# Patient Record
Sex: FEMALE | Race: WHITE | NOT HISPANIC OR LATINO | Employment: OTHER | URBAN - METROPOLITAN AREA
[De-identification: names, ages, dates, MRNs, and addresses within clinical notes are randomized per-mention and may not be internally consistent; named-entity substitution may affect disease eponyms.]

---

## 2020-07-30 LAB
ABS NEUT (OLG): 3.97 X10(3)/MCL (ref 2.1–9.2)
APPEARANCE, UA: CLEAR
BACTERIA SPEC CULT: ABNORMAL /HPF
BASOPHILS # BLD AUTO: 0.1 X10(3)/MCL (ref 0–0.2)
BASOPHILS NFR BLD AUTO: 1 %
BILIRUB UR QL STRIP: NEGATIVE
BUN SERPL-MCNC: 19.3 MG/DL (ref 9.8–20.1)
CALCIUM SERPL-MCNC: 9.2 MG/DL (ref 8.4–10.2)
CHLORIDE SERPL-SCNC: 105 MMOL/L (ref 98–107)
CO2 SERPL-SCNC: 29 MMOL/L (ref 23–31)
COLOR UR: YELLOW
CREAT SERPL-MCNC: 0.78 MG/DL (ref 0.55–1.02)
CREAT/UREA NIT SERPL: 25
EOSINOPHIL # BLD AUTO: 0.3 X10(3)/MCL (ref 0–0.9)
EOSINOPHIL NFR BLD AUTO: 4 %
ERYTHROCYTE [DISTWIDTH] IN BLOOD BY AUTOMATED COUNT: 13.7 % (ref 11.5–17)
GLUCOSE (UA): NEGATIVE
GLUCOSE SERPL-MCNC: 102 MG/DL (ref 82–115)
HCT VFR BLD AUTO: 40.8 % (ref 37–47)
HGB BLD-MCNC: 12.7 GM/DL (ref 12–16)
HGB UR QL STRIP: ABNORMAL
KETONES UR QL STRIP: NEGATIVE
LEUKOCYTE ESTERASE UR QL STRIP: ABNORMAL
LYMPHOCYTES # BLD AUTO: 2.1 X10(3)/MCL (ref 0.6–4.6)
LYMPHOCYTES NFR BLD AUTO: 28 %
MCH RBC QN AUTO: 27.5 PG (ref 27–31)
MCHC RBC AUTO-ENTMCNC: 31.1 GM/DL (ref 33–36)
MCV RBC AUTO: 88.3 FL (ref 80–94)
MONOCYTES # BLD AUTO: 0.8 X10(3)/MCL (ref 0.1–1.3)
MONOCYTES NFR BLD AUTO: 11 %
NEUTROPHILS # BLD AUTO: 3.97 X10(3)/MCL (ref 2.1–9.2)
NEUTROPHILS NFR BLD AUTO: 55 %
NITRITE UR QL STRIP: NEGATIVE
PH UR STRIP: 6.5 [PH] (ref 5–9)
PLATELET # BLD AUTO: 290 X10(3)/MCL (ref 130–400)
PMV BLD AUTO: 11.1 FL (ref 9.4–12.4)
POTASSIUM SERPL-SCNC: 3.8 MMOL/L (ref 3.5–5.1)
PROT UR QL STRIP: NEGATIVE
RBC # BLD AUTO: 4.62 X10(6)/MCL (ref 4.2–5.4)
RBC #/AREA URNS HPF: 9 /HPF (ref 0–2)
SODIUM SERPL-SCNC: 143 MMOL/L (ref 136–145)
SP GR UR STRIP: 1.02 (ref 1–1.03)
SQUAMOUS EPITHELIAL, UA: ABNORMAL
UROBILINOGEN UR STRIP-ACNC: 0.2
WBC # SPEC AUTO: 7.2 X10(3)/MCL (ref 4.5–11.5)
WBC #/AREA URNS HPF: 6 /HPF (ref 0–3)

## 2020-08-04 ENCOUNTER — HISTORICAL (OUTPATIENT)
Dept: SURGERY | Facility: HOSPITAL | Age: 70
End: 2020-08-04

## 2022-04-18 ENCOUNTER — HISTORICAL (OUTPATIENT)
Dept: SURGERY | Facility: HOSPITAL | Age: 72
End: 2022-04-18
Payer: MEDICARE

## 2022-04-30 NOTE — OP NOTE
DATE OF SURGERY:        SURGEON:  Troy Hunter MD    PREOPERATIVE DIAGNOSIS:  Carpal tunnel, right wrist.    POSTOPERATIVE DIAGNOSIS:  Carpal tunnel, right wrist.    PROCEDURE:  Carpal tunnel release.    ANESTHESIA:  Axillary block.    ESTIMATED BLOOD LOSS:  Minimal    PROCEDURE IN DETAIL:  In the operating suite, under axillary block anesthetic, the right arm was prepped and draped in a sterile fashion.  The arm was exsanguinated and tourniquet inflated to 250 mmHg.  The right palm was explored through a small proximal curved incision.  Skin and subcutaneous tissue incised.  Bleeding was controlled with Bovie cautery.  The median nerve was identified at the level of the distal wrist crease and the carpal ligament transected on the ulnar border of the canal throughout the entire length of the canal using a 69 [place] blade.  During dissection, care was taken to identify and preserve the recurrent motor branch.  I tunneled the proximal and distal forearm and released the subcutaneous fascia.  The epineurium surrounding the nerve was gently teased with fine pickups and forceps.  Once the nerve was completely freed, tourniquet was released.  Final hemostasis obtained using Bovie cautery.  The skin incision was closed with vertical mattress sutures of 5-0 and 6-0 Prolene.  A sterile splint dressing was then applied and the procedure terminated.        ______________________________  MD TODD TolentinoL/UED  DD:  08/04/2020  Time:  08:17AM  DT:  08/04/2020  Time:  08:36AM  Job #:  [id number]

## 2022-05-06 ENCOUNTER — OFFICE VISIT (OUTPATIENT)
Dept: ORTHOPEDICS | Facility: CLINIC | Age: 72
End: 2022-05-06
Payer: MEDICARE

## 2022-05-06 VITALS
SYSTOLIC BLOOD PRESSURE: 133 MMHG | HEIGHT: 66 IN | WEIGHT: 140 LBS | BODY MASS INDEX: 22.5 KG/M2 | HEART RATE: 56 BPM | DIASTOLIC BLOOD PRESSURE: 87 MMHG

## 2022-05-06 DIAGNOSIS — M54.9 CHRONIC BACK PAIN GREATER THAN 3 MONTHS DURATION: Primary | Chronic | ICD-10-CM

## 2022-05-06 DIAGNOSIS — M51.26 DISC DISPLACEMENT, LUMBAR: Chronic | ICD-10-CM

## 2022-05-06 DIAGNOSIS — M54.16 LUMBAR RADICULITIS: Chronic | ICD-10-CM

## 2022-05-06 DIAGNOSIS — M51.36 LUMBAR DEGENERATIVE DISC DISEASE: Chronic | ICD-10-CM

## 2022-05-06 DIAGNOSIS — G89.29 CHRONIC BACK PAIN GREATER THAN 3 MONTHS DURATION: Primary | Chronic | ICD-10-CM

## 2022-05-06 PROCEDURE — 99213 OFFICE O/P EST LOW 20 MIN: CPT | Mod: ,,, | Performed by: ANESTHESIOLOGY

## 2022-05-06 PROCEDURE — 99213 PR OFFICE/OUTPT VISIT, EST, LEVL III, 20-29 MIN: ICD-10-PCS | Mod: ,,, | Performed by: ANESTHESIOLOGY

## 2022-05-06 RX ORDER — SODIUM PICOSULFATE, MAGNESIUM OXIDE, AND ANHYDROUS CITRIC ACID 10; 3.5; 12 MG/160ML; G/160ML; G/160ML
LIQUID ORAL
COMMUNITY
Start: 2022-03-23 | End: 2022-07-28 | Stop reason: ALTCHOICE

## 2022-05-06 RX ORDER — CLOTRIMAZOLE AND BETAMETHASONE DIPROPIONATE 10; .64 MG/G; MG/G
CREAM TOPICAL
Status: ON HOLD | COMMUNITY
End: 2022-10-17

## 2022-05-06 RX ORDER — OXYBUTYNIN CHLORIDE 10 MG/1
TABLET, EXTENDED RELEASE ORAL
COMMUNITY
End: 2022-07-28 | Stop reason: ALTCHOICE

## 2022-05-06 RX ORDER — LOSARTAN POTASSIUM 100 MG/1
TABLET ORAL
COMMUNITY

## 2022-05-06 RX ORDER — MULTIVITAMIN
1 TABLET ORAL DAILY
COMMUNITY

## 2022-05-06 RX ORDER — AZELASTINE HYDROCHLORIDE 0.5 MG/ML
SOLUTION/ DROPS OPHTHALMIC
COMMUNITY

## 2022-05-06 RX ORDER — RISEDRONATE SODIUM 35 MG/1
35 TABLET, FILM COATED ORAL WEEKLY
COMMUNITY
Start: 2022-01-24 | End: 2022-07-28 | Stop reason: ALTCHOICE

## 2022-05-06 RX ORDER — AMLODIPINE BESYLATE 5 MG/1
TABLET ORAL
COMMUNITY

## 2022-05-06 RX ORDER — SOLIFENACIN SUCCINATE 10 MG/1
1 TABLET, FILM COATED ORAL EVERY MORNING
COMMUNITY
End: 2022-07-28 | Stop reason: ALTCHOICE

## 2022-05-06 NOTE — PROGRESS NOTES
Lisa Concepcion MD        PATIENT NAME: Greta Chun  : 1950  DATE: 22  MRN: [id number]      Billing Provider: Lisa Concepcion MD  Level of Service:   Patient PCP Information     Provider PCP Type    Primary Doctor No General          Reason for Visit / Chief Complaint: Post-op Evaluation (Post-op Right L4 & L5 TFESI, pt states she still has pain, injection gave her relief for about 2 weeks, pain comes and goes)       Update PCP  Update Chief Complaint         History of Present Illness / Problem Focused Workflow     Greta Chun presents to the clinic with Post-op Evaluation (Post-op Right L4 & L5 TFESI, pt states she still has pain, injection gave her relief for about 2 weeks, pain comes and goes)     This is a 72-year-old female who returns to clinic today for follow-up of her chronic low back pain radiating down the right lower extremity.  She underwent right L4-5 transforaminal epidural steroid injection about 3 weeks ago.  She states that she did very well for 2 weeks, but some of the pain has come back.  She states that the pain is still not as often or as severe as it was before the injections.  She currently does not have any pain at all, but has gotten up to 8/10 at worst a couple days ago when she was standing in the kitchen to cook dinner.      Review of Systems     Review of Systems   Constitutional: Negative.    HENT: Negative.    Respiratory: Negative.    Cardiovascular: Negative.    Gastrointestinal: Negative.    Musculoskeletal: Positive for back pain and leg pain.   Integumentary:  Negative.   Neurological: Negative.    Hematological: Negative.    Psychiatric/Behavioral: Negative.         Medical / Social / Family History     Past Medical History:   Diagnosis Date    Hypertension     Tumors        Past Surgical History:   Procedure Laterality Date    BACK SURGERY      CARPAL TUNNEL RELEASE      SURGICAL REMOVAL OF BONE SPUR      TUBAL LIGATION         Social History  Ms. Chun   reports that she has never smoked. She has never used smokeless tobacco. She reports previous alcohol use. She reports that she does not use drugs.    Family History  Ms.'s Luent family history is not on file.    Medications and Allergies     Medications  Outpatient Medications Marked as Taking for the 5/6/22 encounter (Office Visit) with Lisa Concepcion MD   Medication Sig Dispense Refill    amLODIPine (NORVASC) 5 MG tablet amlodipine 5 mg tablet   TAKE 1 TABLET BY MOUTH EVERY DAY      azelastine (OPTIVAR) 0.05 % ophthalmic solution azelastine 0.05 % eye drops   INSTILL 1 DROP INTO BOTH EYES TWICE DAILY      CLENPIQ 10 mg-3.5 gram -12 gram/160 mL Soln TAKE AS DIRECTED      clotrimazole-betamethasone 1-0.05% (LOTRISONE) cream clotrimazole-betamethasone 1 %-0.05 % topical cream   APPLY TWICE DAILY      losartan (COZAAR) 100 MG tablet losartan 100 mg tablet   TAKE 1 TABLET BY MOUTH EVERY DAY      multivit with min-folic acid 0.4 mg Tab 1 tablet      oxybutynin (DITROPAN-XL) 10 MG 24 hr tablet oxybutynin chloride ER 10 mg tablet,extended release 24 hr   TAKE 1 TABLET BY MOUTH EVERY DAY      propranoloL 120 mg Cp24 propranolol  mg capsule,24 hr,extended release   TAKE 1 CAPSULE BY MOUTH EVERY DAY      risedronate (ACTONEL) 35 MG tablet Take 35 mg by mouth once a week.      solifenacin (VESICARE) 10 MG tablet Take 1 tablet by mouth every morning.         Allergies  Review of patient's allergies indicates:   Allergen Reactions    Celebrex [celecoxib]        Physical Examination     Vitals:    05/06/22 0955   BP: 133/87   Pulse: (!) 56     Spine Musculoskeletal Exam    General        Constitutional: appears stated age, well-developed and well-nourished    Scleral icterus: no    Labored breathing: no    Psychiatric: normal mood and affect and no acute distress    Neurological: alert and oriented x3    Skin: intact    Gait      Gait is normal.    Inspection        Thoracolumbar      Thoracolumbar inspection  is normal.    Strength      Thoracolumbar      Thoracolumbar motor exam is normal.    Sensory       Thoracolumbar      Thoracolumbar sensation is normal.       Assessment and Plan (including Health Maintenance)      Problem List  Smart Sets  Document Outside HM   :    Plan:   Chronic back pain greater than 3 months duration    Lumbar degenerative disc disease    Disc displacement, lumbar    Lumbar radiculitis    She is better overall since her injections last month. Discussed repeating but she'd like to hold off for now. Will call when she is ready.       Problem List Items Addressed This Visit        Orthopedic Problems    Lumbar degenerative disc disease    Disc displacement, lumbar       Other    Chronic back pain greater than 3 months duration - Primary    Lumbar radiculitis            No future appointments.     There are no Patient Instructions on file for this visit.  No follow-ups on file.     Signature:  Lisa Concepcion MD      Date of encounter: 5/6/22

## 2022-05-21 NOTE — HISTORICAL [facility] CERNER
This is a historical note converted from Abhinav. Formatting and pictures may have been removed.  Please reference Abhinav for original formatting and attached multimedia. Procedure Name  1. Right L4?Transforaminal Epidural Steroid Injection  2. Right L5 Transforaminal Epidural Steroid Injection  ?  Pre-Procedure Diagnoses:  1. Chronic pain syndrome  2. Low back pain  3. Lumbar radiculopathy  4. Lumbar disc displacement  5. Lumbar degenerative disc disease  ?   Post-Procedure Diagnoses:  1. Chronic pain syndrome  2. Low back pain  3. Lumbar radiculopathy  4. Lumbar disc displacement  5. Lumbar degenerative disc disease  ?   Anesthesia:  Local and MAC  ?   Estimated Blood Loss:  None  ?  Complications:  None  ?  Informed Consent:  The procedure, risks, benefits, and alternatives were discussed with the patient.? There were no contraindications to the procedure.? The patient expressed understanding and agreed to proceed.? Fully informed written consent was obtained.?  ?  Description of the Procedure:  The patient was taken to the operating room.? IV access was obtained prior to the start of the procedure.? The patient was positioned prone on the fluoroscopy table.? Continuous hemodynamic monitoring was initiated and continued throughout the duration of the procedure.? The skin overlying the lumbosacral spine was prepped with Chloraprep and draped into a sterile field.? An oblique fluoroscopic view was obtained on the right side at L4, with the superior articular process of the inferior vertebral body aligned with the pedicle.? Skin anesthesia was achieved using 1 mL of 1% lidocaine.? A 22-gauge 3.5-inch Quinke spinal needle was slowly inserted and advanced towards the 6 oclock position of the pedicle and into the epidural space.? Proper needle position was confirmed under AP, oblique, and lateral fluoroscopic views.? Negative aspiration for blood or CSF was confirmed.? 0.5 mL of Isovue contrast was injected.?  Fluoroscopic imaging revealed a clear outline of the spinal nerve with proximal spread of agent through the neural foramen and into the epidural space.? Then a combination of 5 mg of dexamethasone and 1 mL of 0.25% bupivacaine was easily injected.? There was no pain on injection.? The needle was removed intact and bleeding was nil.? The same procedure was repeated in identical fashion on the right side at L5. Sterile bandages were applied.? The patient was taken to the recovery room for further observation in stable condition.? The patient was then discharged home without any complications.

## 2022-07-12 ENCOUNTER — OFFICE VISIT (OUTPATIENT)
Dept: ORTHOPEDICS | Facility: CLINIC | Age: 72
End: 2022-07-12
Payer: MEDICARE

## 2022-07-12 DIAGNOSIS — M51.26 DISC DISPLACEMENT, LUMBAR: ICD-10-CM

## 2022-07-12 DIAGNOSIS — M51.36 LUMBAR DEGENERATIVE DISC DISEASE: Primary | ICD-10-CM

## 2022-07-12 DIAGNOSIS — M54.9 CHRONIC BACK PAIN GREATER THAN 3 MONTHS DURATION: ICD-10-CM

## 2022-07-12 DIAGNOSIS — M54.16 LUMBAR RADICULITIS: ICD-10-CM

## 2022-07-12 DIAGNOSIS — G89.29 CHRONIC BACK PAIN GREATER THAN 3 MONTHS DURATION: ICD-10-CM

## 2022-07-12 PROCEDURE — 99214 OFFICE O/P EST MOD 30 MIN: CPT | Mod: ,,, | Performed by: ANESTHESIOLOGY

## 2022-07-12 PROCEDURE — 99214 PR OFFICE/OUTPT VISIT, EST, LEVL IV, 30-39 MIN: ICD-10-PCS | Mod: ,,, | Performed by: ANESTHESIOLOGY

## 2022-07-12 NOTE — PROGRESS NOTES
Lisa Concepcion MD        PATIENT NAME: Greta Chun  : 1950  DATE: 22  MRN: [id number]      Billing Provider: Lisa Concepcion MD  Level of Service:   Patient PCP Information     Provider PCP Type    Primary Doctor No General          Reason for Visit / Chief Complaint: Follow-up (C/o Right sciatic pain. Pt states she would like to schedule procedure. She reports inj did help for few weeks; increase pain when standing in one place for too long. Pain level 7-8 out of 10. )       Update PCP  Update Chief Complaint         History of Present Illness / Problem Focused Workflow     Greta Chun presents to the clinic with Follow-up (C/o Right sciatic pain. Pt states she would like to schedule procedure. She reports inj did help for few weeks; increase pain when standing in one place for too long. Pain level 7-8 out of 10. )     Pain starting to become more frequent and worse in severity since last visit  Pain present primarily when standing in one place for too long      Review of Systems     Review of Systems   Musculoskeletal: Positive for back pain and leg pain.   All other systems reviewed and are negative.       Medical / Social / Family History     Past Medical History:   Diagnosis Date    Hypertension     Tumors        Past Surgical History:   Procedure Laterality Date    BACK SURGERY      CARPAL TUNNEL RELEASE      SURGICAL REMOVAL OF BONE SPUR      TUBAL LIGATION         Social History  Ms. Chun  reports that she has never smoked. She has never used smokeless tobacco. She reports previous alcohol use. She reports that she does not use drugs.    Family History  Ms.'s Chun family history is not on file.    Medications and Allergies     Medications  Outpatient Medications Marked as Taking for the 22 encounter (Office Visit) with Lisa Concepcion MD   Medication Sig Dispense Refill    amLODIPine (NORVASC) 5 MG tablet amlodipine 5 mg tablet   TAKE 1 TABLET BY MOUTH EVERY DAY       azelastine (OPTIVAR) 0.05 % ophthalmic solution azelastine 0.05 % eye drops   INSTILL 1 DROP INTO BOTH EYES TWICE DAILY      CLENPIQ 10 mg-3.5 gram -12 gram/160 mL Soln TAKE AS DIRECTED      clotrimazole-betamethasone 1-0.05% (LOTRISONE) cream clotrimazole-betamethasone 1 %-0.05 % topical cream   APPLY TWICE DAILY      losartan (COZAAR) 100 MG tablet losartan 100 mg tablet   TAKE 1 TABLET BY MOUTH EVERY DAY      multivit with min-folic acid 0.4 mg Tab 1 tablet      oxybutynin (DITROPAN-XL) 10 MG 24 hr tablet oxybutynin chloride ER 10 mg tablet,extended release 24 hr   TAKE 1 TABLET BY MOUTH EVERY DAY      propranoloL 120 mg Cp24 propranolol  mg capsule,24 hr,extended release   TAKE 1 CAPSULE BY MOUTH EVERY DAY      risedronate (ACTONEL) 35 MG tablet Take 35 mg by mouth once a week.         Allergies  Review of patient's allergies indicates:   Allergen Reactions    Celebrex [celecoxib]        Physical Examination   There were no vitals filed for this visit.  Spine Musculoskeletal Exam    General        Constitutional: appears stated age, well-developed and well-nourished    Scleral icterus: no    Labored breathing: no    Psychiatric: normal mood and affect and no acute distress    Neurological: alert and oriented x3    Skin: intact    Gait      Gait is normal.    Inspection      Leg length disparity: no discrepancy      Thoracolumbar      Erythema: none    Swelling: none    Edema (right lower extremity): none    Edema (left lower extremity): none    Ecchymosis: none    Deformity: none       Assessment and Plan (including Health Maintenance)      Problem List  Smart Sets  Document Outside HM   :    Plan:   Lumbar degenerative disc disease    Disc displacement, lumbar    Lumbar radiculitis    Chronic back pain greater than 3 months duration       Schedule right L4 and L5 TFESI.    Problem List Items Addressed This Visit        Orthopedic Problems    Lumbar degenerative disc disease - Primary    Disc  displacement, lumbar       Other    Chronic back pain greater than 3 months duration    Lumbar radiculitis            No future appointments.     There are no Patient Instructions on file for this visit.  No follow-ups on file.     Signature:  Lisa Concepcion MD      Date of encounter: 7/12/22

## 2022-07-12 NOTE — H&P (VIEW-ONLY)
Lisa Concepcion MD        PATIENT NAME: Greta Chun  : 1950  DATE: 22  MRN: [id number]      Billing Provider: Lisa Concepcion MD  Level of Service:   Patient PCP Information     Provider PCP Type    Primary Doctor No General          Reason for Visit / Chief Complaint: Follow-up (C/o Right sciatic pain. Pt states she would like to schedule procedure. She reports inj did help for few weeks; increase pain when standing in one place for too long. Pain level 7-8 out of 10. )       Update PCP  Update Chief Complaint         History of Present Illness / Problem Focused Workflow     Greta Chun presents to the clinic with Follow-up (C/o Right sciatic pain. Pt states she would like to schedule procedure. She reports inj did help for few weeks; increase pain when standing in one place for too long. Pain level 7-8 out of 10. )     Pain starting to become more frequent and worse in severity since last visit  Pain present primarily when standing in one place for too long      Review of Systems     Review of Systems   Musculoskeletal: Positive for back pain and leg pain.   All other systems reviewed and are negative.       Medical / Social / Family History     Past Medical History:   Diagnosis Date    Hypertension     Tumors        Past Surgical History:   Procedure Laterality Date    BACK SURGERY      CARPAL TUNNEL RELEASE      SURGICAL REMOVAL OF BONE SPUR      TUBAL LIGATION         Social History  Ms. Chun  reports that she has never smoked. She has never used smokeless tobacco. She reports previous alcohol use. She reports that she does not use drugs.    Family History  Ms.'s Chun family history is not on file.    Medications and Allergies     Medications  Outpatient Medications Marked as Taking for the 22 encounter (Office Visit) with Lisa Concepcion MD   Medication Sig Dispense Refill    amLODIPine (NORVASC) 5 MG tablet amlodipine 5 mg tablet   TAKE 1 TABLET BY MOUTH EVERY DAY       azelastine (OPTIVAR) 0.05 % ophthalmic solution azelastine 0.05 % eye drops   INSTILL 1 DROP INTO BOTH EYES TWICE DAILY      CLENPIQ 10 mg-3.5 gram -12 gram/160 mL Soln TAKE AS DIRECTED      clotrimazole-betamethasone 1-0.05% (LOTRISONE) cream clotrimazole-betamethasone 1 %-0.05 % topical cream   APPLY TWICE DAILY      losartan (COZAAR) 100 MG tablet losartan 100 mg tablet   TAKE 1 TABLET BY MOUTH EVERY DAY      multivit with min-folic acid 0.4 mg Tab 1 tablet      oxybutynin (DITROPAN-XL) 10 MG 24 hr tablet oxybutynin chloride ER 10 mg tablet,extended release 24 hr   TAKE 1 TABLET BY MOUTH EVERY DAY      propranoloL 120 mg Cp24 propranolol  mg capsule,24 hr,extended release   TAKE 1 CAPSULE BY MOUTH EVERY DAY      risedronate (ACTONEL) 35 MG tablet Take 35 mg by mouth once a week.         Allergies  Review of patient's allergies indicates:   Allergen Reactions    Celebrex [celecoxib]        Physical Examination   There were no vitals filed for this visit.  Spine Musculoskeletal Exam    General        Constitutional: appears stated age, well-developed and well-nourished    Scleral icterus: no    Labored breathing: no    Psychiatric: normal mood and affect and no acute distress    Neurological: alert and oriented x3    Skin: intact    Gait      Gait is normal.    Inspection      Leg length disparity: no discrepancy      Thoracolumbar      Erythema: none    Swelling: none    Edema (right lower extremity): none    Edema (left lower extremity): none    Ecchymosis: none    Deformity: none       Assessment and Plan (including Health Maintenance)      Problem List  Smart Sets  Document Outside HM   :    Plan:   Lumbar degenerative disc disease    Disc displacement, lumbar    Lumbar radiculitis    Chronic back pain greater than 3 months duration       Schedule right L4 and L5 TFESI.    Problem List Items Addressed This Visit        Orthopedic Problems    Lumbar degenerative disc disease - Primary    Disc  displacement, lumbar       Other    Chronic back pain greater than 3 months duration    Lumbar radiculitis            No future appointments.     There are no Patient Instructions on file for this visit.  No follow-ups on file.     Signature:  Lisa Concepcion MD      Date of encounter: 7/12/22

## 2022-07-28 RX ORDER — MELATONIN 10 MG
CAPSULE ORAL NIGHTLY
COMMUNITY

## 2022-08-01 ENCOUNTER — HOSPITAL ENCOUNTER (OUTPATIENT)
Dept: RADIOLOGY | Facility: HOSPITAL | Age: 72
Discharge: HOME | End: 2022-08-01
Attending: ANESTHESIOLOGY
Payer: MEDICARE

## 2022-08-01 ENCOUNTER — ANESTHESIA EVENT (OUTPATIENT)
Dept: SURGERY | Facility: HOSPITAL | Age: 72
End: 2022-08-01
Payer: MEDICARE

## 2022-08-01 ENCOUNTER — ANESTHESIA (OUTPATIENT)
Dept: SURGERY | Facility: HOSPITAL | Age: 72
End: 2022-08-01
Payer: MEDICARE

## 2022-08-01 ENCOUNTER — HOSPITAL ENCOUNTER (OUTPATIENT)
Facility: HOSPITAL | Age: 72
Discharge: HOME | End: 2022-08-01
Attending: ANESTHESIOLOGY | Admitting: ANESTHESIOLOGY
Payer: MEDICARE

## 2022-08-01 DIAGNOSIS — M54.9 BACK PAIN: ICD-10-CM

## 2022-08-01 DIAGNOSIS — M54.9 CHRONIC BACK PAIN GREATER THAN 3 MONTHS DURATION: ICD-10-CM

## 2022-08-01 DIAGNOSIS — G89.29 CHRONIC BACK PAIN GREATER THAN 3 MONTHS DURATION: ICD-10-CM

## 2022-08-01 PROCEDURE — 37000008 HC ANESTHESIA 1ST 15 MINUTES: Performed by: ANESTHESIOLOGY

## 2022-08-01 PROCEDURE — 62323 NJX INTERLAMINAR LMBR/SAC: CPT | Performed by: ANESTHESIOLOGY

## 2022-08-01 PROCEDURE — 76000 FLUOROSCOPY <1 HR PHYS/QHP: CPT | Mod: TC

## 2022-08-01 PROCEDURE — 64483 NJX AA&/STRD TFRM EPI L/S 1: CPT | Mod: RT | Performed by: ANESTHESIOLOGY

## 2022-08-01 PROCEDURE — 64484 NJX AA&/STRD TFRM EPI L/S EA: CPT | Mod: RT,,, | Performed by: ANESTHESIOLOGY

## 2022-08-01 PROCEDURE — 64483 NJX AA&/STRD TFRM EPI L/S 1: CPT | Mod: RT,,, | Performed by: ANESTHESIOLOGY

## 2022-08-01 PROCEDURE — 63600175 PHARM REV CODE 636 W HCPCS: Performed by: NURSE ANESTHETIST, CERTIFIED REGISTERED

## 2022-08-01 PROCEDURE — 25000003 PHARM REV CODE 250: Performed by: ANESTHESIOLOGY

## 2022-08-01 PROCEDURE — 63600175 PHARM REV CODE 636 W HCPCS: Performed by: ANESTHESIOLOGY

## 2022-08-01 PROCEDURE — 25000003 PHARM REV CODE 250: Performed by: NURSE ANESTHETIST, CERTIFIED REGISTERED

## 2022-08-01 PROCEDURE — 64484 PRA INJECT ANES/STEROID FORAMEN LUMBAR/SACRAL W IMG GUIDE ,EA ADD LEVEL: ICD-10-PCS | Mod: RT,,, | Performed by: ANESTHESIOLOGY

## 2022-08-01 PROCEDURE — 64484 NJX AA&/STRD TFRM EPI L/S EA: CPT | Mod: RT

## 2022-08-01 PROCEDURE — 64483 PR EPIDURAL INJ, ANES/STEROID, TRANSFORAMINAL, LUMB/SACR, SNGL LEVL: ICD-10-PCS | Mod: RT,,, | Performed by: ANESTHESIOLOGY

## 2022-08-01 RX ORDER — LIDOCAINE HYDROCHLORIDE 10 MG/ML
INJECTION, SOLUTION EPIDURAL; INFILTRATION; INTRACAUDAL; PERINEURAL
Status: COMPLETED
Start: 2022-08-01 | End: 2022-08-01

## 2022-08-01 RX ORDER — LIDOCAINE HYDROCHLORIDE 10 MG/ML
INJECTION, SOLUTION EPIDURAL; INFILTRATION; INTRACAUDAL; PERINEURAL
Status: DISCONTINUED | OUTPATIENT
Start: 2022-08-01 | End: 2022-08-01

## 2022-08-01 RX ORDER — BUPIVACAINE HYDROCHLORIDE 2.5 MG/ML
INJECTION, SOLUTION EPIDURAL; INFILTRATION; INTRACAUDAL
Status: DISCONTINUED
Start: 2022-08-01 | End: 2022-08-01 | Stop reason: HOSPADM

## 2022-08-01 RX ORDER — SODIUM CHLORIDE 9 MG/ML
INJECTION, SOLUTION INTRAVENOUS CONTINUOUS
Status: DISCONTINUED | OUTPATIENT
Start: 2022-08-01 | End: 2022-08-01 | Stop reason: HOSPADM

## 2022-08-01 RX ORDER — LIDOCAINE HYDROCHLORIDE 10 MG/ML
INJECTION, SOLUTION EPIDURAL; INFILTRATION; INTRACAUDAL; PERINEURAL
Status: DISCONTINUED | OUTPATIENT
Start: 2022-08-01 | End: 2022-08-01 | Stop reason: HOSPADM

## 2022-08-01 RX ORDER — DEXAMETHASONE SODIUM PHOSPHATE 4 MG/ML
INJECTION, SOLUTION INTRA-ARTICULAR; INTRALESIONAL; INTRAMUSCULAR; INTRAVENOUS; SOFT TISSUE
Status: DISCONTINUED
Start: 2022-08-01 | End: 2022-08-01 | Stop reason: WASHOUT

## 2022-08-01 RX ORDER — DEXAMETHASONE SODIUM PHOSPHATE 10 MG/ML
INJECTION INTRAMUSCULAR; INTRAVENOUS
Status: DISCONTINUED | OUTPATIENT
Start: 2022-08-01 | End: 2022-08-01 | Stop reason: HOSPADM

## 2022-08-01 RX ORDER — PROPOFOL 10 MG/ML
VIAL (ML) INTRAVENOUS
Status: DISCONTINUED | OUTPATIENT
Start: 2022-08-01 | End: 2022-08-01

## 2022-08-01 RX ORDER — BUPIVACAINE HYDROCHLORIDE 2.5 MG/ML
INJECTION, SOLUTION EPIDURAL; INFILTRATION; INTRACAUDAL
Status: DISCONTINUED | OUTPATIENT
Start: 2022-08-01 | End: 2022-08-01 | Stop reason: HOSPADM

## 2022-08-01 RX ORDER — LIDOCAINE HYDROCHLORIDE 10 MG/ML
1 INJECTION, SOLUTION EPIDURAL; INFILTRATION; INTRACAUDAL; PERINEURAL ONCE AS NEEDED
Status: DISCONTINUED | OUTPATIENT
Start: 2022-08-01 | End: 2022-08-01 | Stop reason: HOSPADM

## 2022-08-01 RX ORDER — DEXAMETHASONE SODIUM PHOSPHATE 10 MG/ML
INJECTION INTRAMUSCULAR; INTRAVENOUS
Status: DISCONTINUED
Start: 2022-08-01 | End: 2022-08-01 | Stop reason: HOSPADM

## 2022-08-01 RX ADMIN — PROPOFOL 100 MG: 10 INJECTION, EMULSION INTRAVENOUS at 10:08

## 2022-08-01 RX ADMIN — LIDOCAINE HYDROCHLORIDE 2 ML: 10 INJECTION, SOLUTION EPIDURAL; INFILTRATION; INTRACAUDAL; PERINEURAL at 10:08

## 2022-08-01 NOTE — DISCHARGE SUMMARY
[facility] Surgical - Periop Services  Discharge Note  Short Stay    Procedure(s) (LRB):  INJECTION, STEROID, EPIDURAL, TRANSFORAMINAL APPROACH Right L4 & L5 TFESI (Right)    OUTCOME: Patient tolerated treatment/procedure well without complication and is now ready for discharge.    DISPOSITION: Home or Self Care    FINAL DIAGNOSIS:  <principal problem not specified>    FOLLOWUP: In clinic    DISCHARGE INSTRUCTIONS:  No discharge procedures on file.     TIME SPENT ON DISCHARGE: 5 minutes

## 2022-08-01 NOTE — OP NOTE
Procedure:  1.   Right L4 transforaminal epidural steroid injection  2.   Right L5 transforaminal epidural steroid injection    Pre-Procedure Diagnoses:  1. Chronic back pain greater than 3 months  2. Lumbar degenerative disc disease  3. Lumbar spondylosis  4. Lumbar disc displacement    Post-Procedure Diagnoses:  1. Chronic back pain greater than 3 months  2. Lumbar degenerative disc disease  3. Lumbar spondylosis  4. Lumbar disc displacement    Anesthesia:  Local and MAC    Estimated Blood Loss:  < 2 ML    Consent:  The procedure, risks, benefits, and alternatives were discussed with the patient.  The patient voiced understanding and fully informed written consent was obtained.    Description of the Procedure:  The patient was taken to the operating room and placed in the prone position. The skin overlying the lumbar spine was prepped with Chloraprep and draped in the usual sterile fashion.  An oblique fluoroscopic view was obtained on the right side at L4, with the superior articular process of the inferior vertebral body aligned with the pedicle.  Skin anesthesia was achieved using 2 mL of lidocaine 1%.  A 22-gauge 3.5-inch Quinke spinal needle was inserted and advanced under intermittent fluoroscopic views into the epidural space. Proper needle position was confirmed under AP, oblique, and lateral fluoroscopic views.  Negative aspiration for blood or CSF was confirmed. 1 mL of contrast was injected, which revealed spread into the epidural space.  Then a combination of 5 mg of dexamethasone with 1 mL of 0.25% bupivacaine was easily injected.   There was no pain on injection. The needle was removed intact and bleeding was nil.  The same procedure was repeated in identical fashion on the right side at L5.  Sterile bandages were applied. The patient was taken to the recovery room for further observation in stable condition. The patient was then discharged home with no complications.

## 2022-08-01 NOTE — ANESTHESIA POSTPROCEDURE EVALUATION
Anesthesia Post Evaluation    Patient: Greta Luent    Procedure(s) Performed: Procedure(s) (LRB):  INJECTION, STEROID, EPIDURAL, TRANSFORAMINAL APPROACH Right L4 & L5 TFESI (Right)    Final Anesthesia Type: general      Patient location during evaluation: PACU  Patient participation: Yes- Able to Participate  Level of consciousness: sedated and awake  Post-procedure vital signs: reviewed and stable  Pain management: adequate  Airway patency: patent  JULIAN mitigation strategies: Verification of full reversal of neuromuscular block  PONV status at discharge: No PONV  Anesthetic complications: no      Cardiovascular status: blood pressure returned to baseline, hemodynamically stable and stable  Respiratory status: unassisted, spontaneous ventilation and room air  Hydration status: euvolemic  Follow-up not needed.          Vitals  Taken Time   BP  08/01/22 1014   Temp  08/01/22 1014   Pulse  08/01/22 1014   Resp  08/01/22 1014   SpO2  08/01/22 1014         No case tracking events are documented in the log.      Pain/Cydney Score: No data recorded

## 2022-08-01 NOTE — DISCHARGE INSTRUCTIONS
Epidural Steroid Injection After Care    You may take the bandage off and shower tomorrow. Check for signs and symptoms of infection daily: redness, warmth, pus or drainage, increase swelling, and foul odor.  Wash your hands before and after touching your puncture site.  No heavy lifting for 1 week.  No driving today.  You may resume your regular diet today.  You may resume your regular activities tomorrow.  No heating pad on the puncture site. You may use for pain or swelling for no longer than 15 minutes at a time for 3-4 times a day.   Relax on the day of the injection.  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  It may take a few days before you will feel the effects of the injection.      Contact your doctor for:  Increase pain not controlled with medication.  Any new numbness or tingling.  Fever greater than 102 degree Fahrenheit that does not break with medication.  Any signs or symptoms of infection: redness, warmth, pus or drainage, increase swelling, and foul odor at the puncture site.

## 2022-08-02 VITALS
SYSTOLIC BLOOD PRESSURE: 133 MMHG | HEIGHT: 65 IN | DIASTOLIC BLOOD PRESSURE: 78 MMHG | WEIGHT: 144.63 LBS | RESPIRATION RATE: 16 BRPM | HEART RATE: 64 BPM | TEMPERATURE: 98 F | BODY MASS INDEX: 24.1 KG/M2 | OXYGEN SATURATION: 97 %

## 2022-08-16 ENCOUNTER — OFFICE VISIT (OUTPATIENT)
Dept: ORTHOPEDICS | Facility: CLINIC | Age: 72
End: 2022-08-16
Payer: MEDICARE

## 2022-08-16 VITALS
BODY MASS INDEX: 23.99 KG/M2 | SYSTOLIC BLOOD PRESSURE: 143 MMHG | WEIGHT: 144 LBS | HEIGHT: 65 IN | HEART RATE: 65 BPM | DIASTOLIC BLOOD PRESSURE: 91 MMHG

## 2022-08-16 DIAGNOSIS — M51.36 LUMBAR DEGENERATIVE DISC DISEASE: Primary | ICD-10-CM

## 2022-08-16 DIAGNOSIS — M54.9 CHRONIC BACK PAIN GREATER THAN 3 MONTHS DURATION: ICD-10-CM

## 2022-08-16 DIAGNOSIS — M54.16 LUMBAR RADICULITIS: ICD-10-CM

## 2022-08-16 DIAGNOSIS — M51.26 DISC DISPLACEMENT, LUMBAR: ICD-10-CM

## 2022-08-16 DIAGNOSIS — G89.29 CHRONIC BACK PAIN GREATER THAN 3 MONTHS DURATION: ICD-10-CM

## 2022-08-16 PROCEDURE — 99213 PR OFFICE/OUTPT VISIT, EST, LEVL III, 20-29 MIN: ICD-10-PCS | Mod: ,,, | Performed by: ANESTHESIOLOGY

## 2022-08-16 PROCEDURE — 99213 OFFICE O/P EST LOW 20 MIN: CPT | Mod: ,,, | Performed by: ANESTHESIOLOGY

## 2022-08-16 NOTE — PROGRESS NOTES
Lisa Concepcion MD        PATIENT NAME: Greta Chun  : 1950  DATE: 22  MRN: [id number]      Billing Provider: Lisa Concepcion MD  Level of Service:   Patient PCP Information     Provider PCP Type    Primary Doctor No General          Reason for Visit / Chief Complaint: Post-op Evaluation (Right L4 & L5 TFESI 22.  States noticing improvement with injections. Does report still having pain in her right leg at this time. )       Update PCP  Update Chief Complaint         History of Present Illness / Problem Focused Workflow     Greta Chun presents to the clinic with Post-op Evaluation (Right L4 & L5 TFESI 22.  States noticing improvement with injections. Does report still having pain in her right leg at this time. )     This is a 72-year-old female who returns to clinic today for follow-up of her chronic low back pain radiating down the right lower extremity.  She underwent right L4 and L5 transforaminal epidural steroid injection about 2 weeks ago on 22.  It took a few days to take effect, but she is now getting some relief.  The pain has not completely resolved, but she states it is much more tolerable. She has no pain at this time.      Review of Systems     Review of Systems   Constitutional: Negative.    HENT: Negative.    Respiratory: Negative.    Cardiovascular: Negative.    Gastrointestinal: Negative.    Musculoskeletal: Positive for back pain and leg pain.   Integumentary:  Negative.   Neurological: Negative.    Hematological: Negative.    Psychiatric/Behavioral: Negative.         Medical / Social / Family History     Past Medical History:   Diagnosis Date    Arthritis     Borderline diabetes     Chronic low back pain     Hypertension     Overactive bladder     Tremors of nervous system     x 25 yrs    Watery eyes        Past Surgical History:   Procedure Laterality Date    BACK SURGERY      CARPAL TUNNEL RELEASE      cholecysectomy      epidural steroid injections       SURGICAL REMOVAL OF BONE SPUR      TRANSFORAMINAL EPIDURAL INJECTION OF STEROID Right 8/1/2022    Procedure: INJECTION, STEROID, EPIDURAL, TRANSFORAMINAL APPROACH Right L4 & L5 TFESI;  Surgeon: Lisa Concepcion MD;  Location: [facility] OR;  Service: Pain Management;  Laterality: Right;  Right L4 & L5 TFESI    TUBAL LIGATION         Social History  Ms. Chun  reports that she has never smoked. She has never used smokeless tobacco. She reports current alcohol use of about 2.0 standard drinks of alcohol per week. She reports that she does not use drugs.    Family History  Ms.'s Chun family history includes Dementia in her father and mother; Hypertension in her father and mother; Stroke in her mother.    Medications and Allergies     Medications  Outpatient Medications Marked as Taking for the 8/16/22 encounter (Office Visit) with Lisa Concepcion MD   Medication Sig Dispense Refill    amLODIPine (NORVASC) 5 MG tablet amlodipine 5 mg tablet   TAKE 1 TABLET BY MOUTH EVERY DAY      azelastine (OPTIVAR) 0.05 % ophthalmic solution azelastine 0.05 % eye drops   INSTILL 1 DROP INTO BOTH EYES TWICE DAILY      clotrimazole-betamethasone 1-0.05% (LOTRISONE) cream clotrimazole-betamethasone 1 %-0.05 % topical cream   APPLY TWICE DAILY      losartan (COZAAR) 100 MG tablet losartan 100 mg tablet   TAKE 1 TABLET BY MOUTH EVERY DAY      melatonin 10 mg Cap Take by mouth every evening.      multivit with min-folic acid 0.4 mg Tab 1 tablet      propranoloL 120 mg Cp24 propranolol  mg capsule,24 hr,extended release   TAKE 1 CAPSULE BY MOUTH EVERY DAY         Allergies  Review of patient's allergies indicates:   Allergen Reactions    Celebrex [celecoxib]        Physical Examination     Vitals:    08/16/22 0946   BP: (!) 143/91   Pulse: 65     Spine Musculoskeletal Exam    General        Constitutional: appears stated age, well-developed and well-nourished    Scleral icterus: no    Labored breathing: no    Psychiatric: normal  mood and affect and no acute distress    Neurological: alert and oriented x3    Skin: intact    Gait      Gait is normal.    Inspection        Thoracolumbar      Thoracolumbar inspection is normal.    Strength      Thoracolumbar      Thoracolumbar motor exam is normal.    Sensory       Thoracolumbar      Thoracolumbar sensation is normal.       Assessment and Plan (including Health Maintenance)      Problem List  Smart Sets  Document Outside HM   :    Plan:   Lumbar degenerative disc disease    Disc displacement, lumbar    Lumbar radiculitis    Chronic back pain greater than 3 months duration    She is doing better since her injections a couple weeks ago. She is scheduled for another injection on October 17, before she leaves to go to [facility] on November 1.  Will cancel if not needed.      Problem List Items Addressed This Visit        Orthopedic Problems    Lumbar degenerative disc disease - Primary    Disc displacement, lumbar       Other    Chronic back pain greater than 3 months duration    Lumbar radiculitis            Future Appointments   Date Time Provider Department Center   9/15/2022 10:45 AM Lisa Concepcion MD [facility]        There are no Patient Instructions on file for this visit.  No follow-ups on file.     Signature:  Lisa Concepcion MD      Date of encounter: 8/16/22

## 2022-09-15 ENCOUNTER — OFFICE VISIT (OUTPATIENT)
Dept: ORTHOPEDICS | Facility: CLINIC | Age: 72
End: 2022-09-15
Payer: MEDICARE

## 2022-09-15 VITALS
SYSTOLIC BLOOD PRESSURE: 154 MMHG | HEART RATE: 67 BPM | DIASTOLIC BLOOD PRESSURE: 90 MMHG | WEIGHT: 144 LBS | HEIGHT: 65 IN | BODY MASS INDEX: 23.99 KG/M2

## 2022-09-15 DIAGNOSIS — M51.26 DISC DISPLACEMENT, LUMBAR: ICD-10-CM

## 2022-09-15 DIAGNOSIS — G89.29 CHRONIC BACK PAIN GREATER THAN 3 MONTHS DURATION: ICD-10-CM

## 2022-09-15 DIAGNOSIS — M51.36 LUMBAR DEGENERATIVE DISC DISEASE: Primary | ICD-10-CM

## 2022-09-15 DIAGNOSIS — M54.9 CHRONIC BACK PAIN GREATER THAN 3 MONTHS DURATION: ICD-10-CM

## 2022-09-15 DIAGNOSIS — M54.16 LUMBAR RADICULITIS: ICD-10-CM

## 2022-09-15 PROCEDURE — 99213 OFFICE O/P EST LOW 20 MIN: CPT | Mod: ,,, | Performed by: ANESTHESIOLOGY

## 2022-09-15 PROCEDURE — 99213 PR OFFICE/OUTPT VISIT, EST, LEVL III, 20-29 MIN: ICD-10-PCS | Mod: ,,, | Performed by: ANESTHESIOLOGY

## 2022-09-15 NOTE — PROGRESS NOTES
Lisa Concepcion MD        PATIENT NAME: Greta Chun  : 1950  DATE: 9/15/22  MRN: [id number]      Billing Provider: Lisa Concepcion MD  Level of Service:   Patient PCP Information       Provider PCP Type    Primary Doctor No General            Reason for Visit / Chief Complaint: Follow-up (States back pain today is 2/10. States the pain has gotten better since last injections. )       Update PCP  Update Chief Complaint         History of Present Illness / Problem Focused Workflow     Greta Chun presents to the clinic with Follow-up (States back pain today is 2/10. States the pain has gotten better since last injections. )     This is a 72-year-old female who returns to clinic today for follow-up of her chronic low back pain radiating down the right lower extremity.  She underwent right L4 and L5 transforaminal epidural steroid injection  on 22.  It took a few days to take effect, but she is still getting some relief.  The pain has not completely resolved, but she states it is much more tolerable.  The pain is in her lower back and radiates down the right lower extremity to her knee.  She currently rates the pain as 2/10 on the NRS.  He for like to try 1 more injection before she leaves on a trip in November.    Follow-up      Review of Systems     Review of Systems   Constitutional: Negative.    HENT: Negative.     Respiratory: Negative.     Cardiovascular: Negative.    Gastrointestinal: Negative.    Musculoskeletal:  Positive for back pain and leg pain.   Integumentary:  Negative.   Neurological: Negative.    Hematological: Negative.    Psychiatric/Behavioral: Negative.        Medical / Social / Family History     Past Medical History:   Diagnosis Date    Arthritis     Borderline diabetes     Chronic low back pain     Hypertension     Overactive bladder     Tremors of nervous system     x 25 yrs    Watery eyes        Past Surgical History:   Procedure Laterality Date    BACK SURGERY      CARPAL  TUNNEL RELEASE      cholecysectomy      epidural steroid injections      SURGICAL REMOVAL OF BONE SPUR      TRANSFORAMINAL EPIDURAL INJECTION OF STEROID Right 8/1/2022    Procedure: INJECTION, STEROID, EPIDURAL, TRANSFORAMINAL APPROACH Right L4 & L5 TFESI;  Surgeon: Lisa Concepcion MD;  Location: [facility];  Service: Pain Management;  Laterality: Right;  Right L4 & L5 TFESI    TUBAL LIGATION         Social History  Ms. Chun  reports that she has never smoked. She has never used smokeless tobacco. She reports current alcohol use of about 2.0 standard drinks per week. She reports that she does not use drugs.    Family History  Ms.'s Chun family history includes Dementia in her father and mother; Hypertension in her father and mother; Stroke in her mother.    Medications and Allergies     Medications  Outpatient Medications Marked as Taking for the 9/15/22 encounter (Office Visit) with Lisa Concepcion MD   Medication Sig Dispense Refill    amLODIPine (NORVASC) 5 MG tablet amlodipine 5 mg tablet   TAKE 1 TABLET BY MOUTH EVERY DAY      azelastine (OPTIVAR) 0.05 % ophthalmic solution azelastine 0.05 % eye drops   INSTILL 1 DROP INTO BOTH EYES TWICE DAILY      clotrimazole-betamethasone 1-0.05% (LOTRISONE) cream clotrimazole-betamethasone 1 %-0.05 % topical cream   APPLY TWICE DAILY      losartan (COZAAR) 100 MG tablet losartan 100 mg tablet   TAKE 1 TABLET BY MOUTH EVERY DAY      melatonin 10 mg Cap Take by mouth every evening.      multivit with min-folic acid 0.4 mg Tab 1 tablet      propranoloL 120 mg Cp24 propranolol  mg capsule,24 hr,extended release   TAKE 1 CAPSULE BY MOUTH EVERY DAY         Allergies  Review of patient's allergies indicates:   Allergen Reactions    Celebrex [celecoxib]        Physical Examination     Vitals:    09/15/22 1046   BP: (!) 154/90   Pulse: 67     Spine Musculoskeletal Exam    Gait    Gait is normal.    Inspection    Thoracolumbar    Thoracolumbar inspection is  normal.    Strength    Thoracolumbar    Thoracolumbar motor exam is normal.       Sensory    Thoracolumbar    Thoracolumbar sensation is normal.    General      Constitutional: appears stated age, well-developed and well-nourished    Scleral icterus: no    Labored breathing: no    Psychiatric: normal mood and affect and no acute distress    Neurological: alert and oriented x3    Skin: intact     Assessment and Plan (including Health Maintenance)      Problem List  Smart Sets  Document Outside HM   :    Plan:   Lumbar degenerative disc disease    Disc displacement, lumbar    Lumbar radiculitis    Chronic back pain greater than 3 months duration    I will schedule her for another injection today.  She will undergo right L4 and L5 transforaminal epidural steroid injections next month.  The plan was discussed with the patient and she wishes to proceed.    Problem List Items Addressed This Visit          Orthopedic Problems    Lumbar degenerative disc disease - Primary    Disc displacement, lumbar       Other    Chronic back pain greater than 3 months duration    Lumbar radiculitis         No future appointments.       There are no Patient Instructions on file for this visit.  No follow-ups on file.     Signature:  Lisa Concepcion MD      Date of encounter: 9/15/22

## 2022-10-12 RX ORDER — CALCIUM CARBONATE 600 MG
600 TABLET ORAL ONCE
COMMUNITY

## 2022-10-16 ENCOUNTER — ANESTHESIA EVENT (OUTPATIENT)
Dept: SURGERY | Facility: HOSPITAL | Age: 72
End: 2022-10-16
Payer: MEDICARE

## 2022-10-17 ENCOUNTER — HOSPITAL ENCOUNTER (OUTPATIENT)
Facility: HOSPITAL | Age: 72
Discharge: HOME | End: 2022-10-17
Attending: ANESTHESIOLOGY | Admitting: ANESTHESIOLOGY
Payer: MEDICARE

## 2022-10-17 ENCOUNTER — ANESTHESIA (OUTPATIENT)
Dept: SURGERY | Facility: HOSPITAL | Age: 72
End: 2022-10-17
Payer: MEDICARE

## 2022-10-17 DIAGNOSIS — G89.29 CHRONIC BACK PAIN GREATER THAN 3 MONTHS DURATION: Primary | ICD-10-CM

## 2022-10-17 DIAGNOSIS — M54.9 CHRONIC BACK PAIN GREATER THAN 3 MONTHS DURATION: Primary | ICD-10-CM

## 2022-10-17 LAB — POCT GLUCOSE: 110 MG/DL (ref 70–110)

## 2022-10-17 PROCEDURE — 63600175 PHARM REV CODE 636 W HCPCS: Performed by: ANESTHESIOLOGY

## 2022-10-17 PROCEDURE — 82962 GLUCOSE BLOOD TEST: CPT | Performed by: ANESTHESIOLOGY

## 2022-10-17 PROCEDURE — 64484 PRA INJECT ANES/STEROID FORAMEN LUMBAR/SACRAL W IMG GUIDE ,EA ADD LEVEL: ICD-10-PCS | Mod: RT,,, | Performed by: ANESTHESIOLOGY

## 2022-10-17 PROCEDURE — 25000003 PHARM REV CODE 250

## 2022-10-17 PROCEDURE — 64483 NJX AA&/STRD TFRM EPI L/S 1: CPT | Performed by: ANESTHESIOLOGY

## 2022-10-17 PROCEDURE — 64483 PR EPIDURAL INJ, ANES/STEROID, TRANSFORAMINAL, LUMB/SACR, SNGL LEVL: ICD-10-PCS | Mod: RT,,, | Performed by: ANESTHESIOLOGY

## 2022-10-17 PROCEDURE — 64483 NJX AA&/STRD TFRM EPI L/S 1: CPT | Mod: RT,,, | Performed by: ANESTHESIOLOGY

## 2022-10-17 PROCEDURE — 37000008 HC ANESTHESIA 1ST 15 MINUTES: Performed by: ANESTHESIOLOGY

## 2022-10-17 PROCEDURE — 25000003 PHARM REV CODE 250: Performed by: ANESTHESIOLOGY

## 2022-10-17 PROCEDURE — 64484 NJX AA&/STRD TFRM EPI L/S EA: CPT | Mod: RT,,, | Performed by: ANESTHESIOLOGY

## 2022-10-17 PROCEDURE — 64484 NJX AA&/STRD TFRM EPI L/S EA: CPT | Performed by: ANESTHESIOLOGY

## 2022-10-17 PROCEDURE — 63600175 PHARM REV CODE 636 W HCPCS: Performed by: NURSE ANESTHETIST, CERTIFIED REGISTERED

## 2022-10-17 RX ORDER — LIDOCAINE HYDROCHLORIDE 10 MG/ML
INJECTION, SOLUTION INTRAVENOUS
Status: DISCONTINUED | OUTPATIENT
Start: 2022-10-17 | End: 2022-10-17

## 2022-10-17 RX ORDER — LIDOCAINE HYDROCHLORIDE 10 MG/ML
INJECTION, SOLUTION EPIDURAL; INFILTRATION; INTRACAUDAL; PERINEURAL
Status: DISCONTINUED | OUTPATIENT
Start: 2022-10-17 | End: 2022-10-17 | Stop reason: HOSPADM

## 2022-10-17 RX ORDER — DEXAMETHASONE SODIUM PHOSPHATE 10 MG/ML
INJECTION INTRAMUSCULAR; INTRAVENOUS
Status: DISCONTINUED | OUTPATIENT
Start: 2022-10-17 | End: 2022-10-17 | Stop reason: HOSPADM

## 2022-10-17 RX ORDER — PROPOFOL 10 MG/ML
VIAL (ML) INTRAVENOUS
Status: DISCONTINUED | OUTPATIENT
Start: 2022-10-17 | End: 2022-10-17

## 2022-10-17 RX ORDER — OMEPRAZOLE 10 MG/1
10 CAPSULE, DELAYED RELEASE ORAL DAILY
COMMUNITY

## 2022-10-17 RX ORDER — BUPIVACAINE HYDROCHLORIDE 2.5 MG/ML
INJECTION, SOLUTION EPIDURAL; INFILTRATION; INTRACAUDAL
Status: DISCONTINUED | OUTPATIENT
Start: 2022-10-17 | End: 2022-10-17 | Stop reason: HOSPADM

## 2022-10-17 RX ORDER — LIDOCAINE HYDROCHLORIDE 10 MG/ML
INJECTION, SOLUTION EPIDURAL; INFILTRATION; INTRACAUDAL; PERINEURAL
Status: DISCONTINUED
Start: 2022-10-17 | End: 2022-10-17 | Stop reason: HOSPADM

## 2022-10-17 RX ORDER — SODIUM CHLORIDE 9 MG/ML
INJECTION, SOLUTION INTRAVENOUS CONTINUOUS
Status: DISCONTINUED | OUTPATIENT
Start: 2022-10-17 | End: 2022-10-17 | Stop reason: HOSPADM

## 2022-10-17 RX ORDER — DEXAMETHASONE SODIUM PHOSPHATE 10 MG/ML
INJECTION INTRAMUSCULAR; INTRAVENOUS
Status: DISCONTINUED
Start: 2022-10-17 | End: 2022-10-17 | Stop reason: HOSPADM

## 2022-10-17 RX ORDER — BUPIVACAINE HYDROCHLORIDE 2.5 MG/ML
INJECTION, SOLUTION EPIDURAL; INFILTRATION; INTRACAUDAL
Status: DISCONTINUED
Start: 2022-10-17 | End: 2022-10-17 | Stop reason: HOSPADM

## 2022-10-17 RX ORDER — MIDAZOLAM HYDROCHLORIDE 1 MG/ML
INJECTION INTRAMUSCULAR; INTRAVENOUS
Status: DISCONTINUED | OUTPATIENT
Start: 2022-10-17 | End: 2022-10-17

## 2022-10-17 RX ORDER — ONDANSETRON 2 MG/ML
INJECTION INTRAMUSCULAR; INTRAVENOUS
Status: DISCONTINUED | OUTPATIENT
Start: 2022-10-17 | End: 2022-10-17

## 2022-10-17 RX ORDER — LIDOCAINE HYDROCHLORIDE 10 MG/ML
1 INJECTION, SOLUTION EPIDURAL; INFILTRATION; INTRACAUDAL; PERINEURAL ONCE AS NEEDED
Status: DISCONTINUED | OUTPATIENT
Start: 2022-10-17 | End: 2022-10-17 | Stop reason: HOSPADM

## 2022-10-17 RX ADMIN — ONDANSETRON 4 MG: 2 INJECTION INTRAMUSCULAR; INTRAVENOUS at 09:10

## 2022-10-17 RX ADMIN — PROPOFOL 50 MG: 10 INJECTION, EMULSION INTRAVENOUS at 09:10

## 2022-10-17 RX ADMIN — LIDOCAINE HYDROCHLORIDE 20 MG: 10 INJECTION, SOLUTION INTRAVENOUS at 09:10

## 2022-10-17 RX ADMIN — MIDAZOLAM HYDROCHLORIDE 1 MG: 1 INJECTION, SOLUTION INTRAMUSCULAR; INTRAVENOUS at 09:10

## 2022-10-17 NOTE — OP NOTE
Procedure:    Right L4  transforaminal epidural steroid injection    Right L5  transforaminal epidural steroid injection    Pre-Procedure Diagnoses:  Chronic back pain greater than 3 months  Lumbar degenerative disc disease  Lumbar spondylosis  Lumbar disc displacement    Post-Procedure Diagnoses:  Chronic back pain greater than 3 months  Lumbar degenerative disc disease  Lumbar spondylosis  Lumbar disc displacement    Anesthesia:  Local and MAC    Estimated Blood Loss:  < 2 ML    Consent:  The procedure, risks, benefits, and alternatives were discussed with the patient.  The patient voiced understanding and fully informed written consent was obtained.    Description of the Procedure:  The patient was taken to the operating room and placed in the prone position. The skin overlying the lumbar spine was prepped with Chloraprep and draped in the usual sterile fashion.  An oblique fluoroscopic view was obtained on the right side at L4, with the superior articular process of the inferior vertebral body aligned with the pedicle.  Skin anesthesia was achieved using 2 mL of lidocaine 1%.  A 22-gauge 3.5-inch Quinke spinal needle was inserted and advanced under intermittent fluoroscopic views into the epidural space. Proper needle position was confirmed under AP, oblique, and lateral fluoroscopic views.  Negative aspiration for blood or CSF was confirmed. 1 mL of contrast was injected, which revealed spread into the epidural space.  Then a combination of 5 mg of dexamethasone with 1 mL of 0.25% bupivacaine was easily injected.   There was no pain on injection. The needle was removed intact and bleeding was nil.  The same procedure was repeated in identical fashion on the right side at L5.  Sterile bandages were applied. The patient was taken to the recovery room for further observation in stable condition. The patient was then discharged home with no complications.

## 2022-10-17 NOTE — DISCHARGE SUMMARY
[facility] Surgical - Periop Services  Discharge Note  Short Stay    Procedure(s) (LRB):  Injection,steroid,epidural,transforaminal approach Right L4 and L5 (Right)      OUTCOME: Patient tolerated treatment/procedure well without complication and is now ready for discharge.    DISPOSITION: Home or Self Care    FINAL DIAGNOSIS:  <principal problem not specified>    FOLLOWUP: In clinic    DISCHARGE INSTRUCTIONS:  No discharge procedures on file.     TIME SPENT ON DISCHARGE: 5 minutes

## 2022-10-17 NOTE — ANESTHESIA POSTPROCEDURE EVALUATION
Anesthesia Post Evaluation    Patient: Greta Luent    Procedure(s) Performed: Procedure(s) (LRB):  Injection,steroid,epidural,transforaminal approach Right L4 and L5 (Right)    Final Anesthesia Type: general      Patient location during evaluation: OPS  Patient participation: Yes- Able to Participate  Level of consciousness: awake and alert and oriented  Post-procedure vital signs: reviewed and stable  Pain management: adequate  Airway patency: patent  JULIAN mitigation strategies: Verification of full reversal of neuromuscular block  PONV status at discharge: No PONV  Anesthetic complications: no      Cardiovascular status: blood pressure returned to baseline and stable  Respiratory status: spontaneous ventilation and unassisted  Hydration status: euvolemic  Follow-up not needed.  Comments: [facility]          Vitals Value Taken Time   /77 10/17/22 0823   Temp 36.7 °C (98 °F) 10/17/22 0823   Pulse 63 10/17/22 0823   Resp 18 10/17/22 0838   SpO2 98 % 10/17/22 0823         No case tracking events are documented in the log.      Pain/Cydney Score: No data recorded

## 2022-10-17 NOTE — DISCHARGE INSTRUCTIONS
EPIDURAL STEROID INJECTION, CARE AFTER                                                                                  NO HEAVY LIFTING FOR ONE WEEK                                                                                  NO HEAT FOR 24 HOURS                                                                                  APPLY ICE PACK FOR COMFORT TO SITES                                                                                  REMOVE BAND-AIDS TOMORROW AND THEN YOU MAY SHOWER                                                                                  NO TUB SOAKING FOR 3-5 DAYS      These instructions give you information on caring for yourself after your procedure. Your doctor may also give you specific instructions. Call your doctor if you have any problems or questions after your procedure.     HOME CARE  Do not drive or use any machinery for the next 24 hours.  Do not do any hard physical activity for the next 24 hours  Do not use a heating pad in area of injection  You may go back to eating as usual    SIDE EFFECTS THAT COULD HAPPEN UP TO 4 HOURS AFTER THE INJECTION  If you have leg weakness or numbness, have someone help you walk. If the weakness or numbness does not go away, or if it gets worse go to the emergency department.  If you have dizziness, lie down right away. This usually helps. Sit up slowly and then when you have been sitting for a few minutes then stand up.  If you have a mild headache, drink fluids (especially drinks with caffeine) Call your doctor if the headache gets worse or persists.  When the numbing medicine wears off you may feel some discomfort where you had the shot. It usually only lasts for a few days. You may put ice over the injection site. Leave ice on for 20 minutes at a time and protect your skin during use.   You may feel minor back pain and stiffness at the site of the shot. Call your doctor if this pain gets worse or does not improve. You may feel  nauseous or vomit several hours after your procedure. If this happens, try drinking small amounts of clear liquids until you feel better. If you continue to feel nauseated or continue vomiting, get help right away.    Steroids may take several days to start working. The shot usually helps leg pain more than back pain. The shot will not fix what is causing the pain but may take away some of the pain. This pain relief may last from 2 weeks to 6 months.     GET HELP RIGHT AWAY IF:  You have very bad pain, headache or neck pain or stiffness  There is a change in your vision (double or blurry vision)  You have a fever over 101 or chills  You have swelling or redness at the injection site  You get weaker  You are not able to control your bladder or bowels  You are not able to urinate

## 2022-10-17 NOTE — H&P
History of Present Illness / Problem Focused Workflow      Greta Chun presents to the clinic with Follow-up (States back pain today is 2/10. States the pain has gotten better since last injections. )     This is a 72-year-old female who returns to clinic today for follow-up of her chronic low back pain radiating down the right lower extremity.  She underwent right L4 and L5 transforaminal epidural steroid injection  on 8/1/22.  It took a few days to take effect, but she is still getting some relief.  The pain has not completely resolved, but she states it is much more tolerable.  The pain is in her lower back and radiates down the right lower extremity to her knee.  She currently rates the pain as 2/10 on the NRS.  He for like to try 1 more injection before she leaves on a trip in November.     Follow-up        Review of Systems      Review of Systems   Constitutional: Negative.    HENT: Negative.     Respiratory: Negative.     Cardiovascular: Negative.    Gastrointestinal: Negative.    Musculoskeletal:  Positive for back pain and leg pain.   Integumentary:  Negative.   Neurological: Negative.    Hematological: Negative.    Psychiatric/Behavioral: Negative.         Medical / Social / Family History           Past Medical History:   Diagnosis Date    Arthritis      Borderline diabetes      Chronic low back pain      Hypertension      Overactive bladder      Tremors of nervous system       x 25 yrs    Watery eyes                 Past Surgical History:   Procedure Laterality Date    BACK SURGERY        CARPAL TUNNEL RELEASE        cholecysectomy        epidural steroid injections        SURGICAL REMOVAL OF BONE SPUR        TRANSFORAMINAL EPIDURAL INJECTION OF STEROID Right 8/1/2022     Procedure: INJECTION, STEROID, EPIDURAL, TRANSFORAMINAL APPROACH Right L4 & L5 TFESI;  Surgeon: Lisa Concepcion MD;  Location: [facility];  Service: Pain Management;  Laterality: Right;  Right L4 & L5 TFESI    TUBAL LIGATION              Social History  Ms. Chun  reports that she has never smoked. She has never used smokeless tobacco. She reports current alcohol use of about 2.0 standard drinks per week. She reports that she does not use drugs.     Family History  Ms.'s Chun family history includes Dementia in her father and mother; Hypertension in her father and mother; Stroke in her mother.     Medications and Allergies      Medications         Outpatient Medications Marked as Taking for the 9/15/22 encounter (Office Visit) with Lisa Concepcion MD   Medication Sig Dispense Refill    amLODIPine (NORVASC) 5 MG tablet amlodipine 5 mg tablet   TAKE 1 TABLET BY MOUTH EVERY DAY        azelastine (OPTIVAR) 0.05 % ophthalmic solution azelastine 0.05 % eye drops   INSTILL 1 DROP INTO BOTH EYES TWICE DAILY        clotrimazole-betamethasone 1-0.05% (LOTRISONE) cream clotrimazole-betamethasone 1 %-0.05 % topical cream   APPLY TWICE DAILY        losartan (COZAAR) 100 MG tablet losartan 100 mg tablet   TAKE 1 TABLET BY MOUTH EVERY DAY        melatonin 10 mg Cap Take by mouth every evening.        multivit with min-folic acid 0.4 mg Tab 1 tablet        propranoloL 120 mg Cp24 propranolol  mg capsule,24 hr,extended release   TAKE 1 CAPSULE BY MOUTH EVERY DAY             Allergies       Review of patient's allergies indicates:   Allergen Reactions    Celebrex [celecoxib]           Physical Examination          Vitals:     09/15/22 1046   BP: (!) 154/90   Pulse: 67      Spine Musculoskeletal Exam     Gait    Gait is normal.     Inspection    Thoracolumbar    Thoracolumbar inspection is normal.     Strength    Thoracolumbar    Thoracolumbar motor exam is normal.        Sensory    Thoracolumbar    Thoracolumbar sensation is normal.     General      Constitutional: appears stated age, well-developed and well-nourished    Scleral icterus: no    Labored breathing: no    Psychiatric: normal mood and affect and no acute distress    Neurological: alert and  oriented x3    Skin: intact      Assessment and Plan (including Health Maintenance)       Problem List   Smart Sets   Document Outside HM   :     Plan:   Lumbar degenerative disc disease     Disc displacement, lumbar     Lumbar radiculitis     Chronic back pain greater than 3 months duration     She will undergo right L4 and L5 transforaminal epidural steroid injections today.  The plan was discussed with the patient and she wishes to proceed.

## 2022-10-17 NOTE — TRANSFER OF CARE
"Anesthesia Transfer of Care Note    Patient: Greta Luent    Procedure(s) Performed: Procedure(s) (LRB):  Injection,steroid,epidural,transforaminal approach Right L4 and L5 (Right)    Patient location: PACU    Anesthesia Type: general    Transport from OR: Transported from OR on room air with adequate spontaneous ventilation    Post pain: adequate analgesia    Post assessment: no apparent anesthetic complications    Post vital signs: stable    Level of consciousness: sedated    Nausea/Vomiting: no nausea/vomiting    Complications: none    Transfer of care protocol was followed      Last vitals:   Visit Vitals  BP (!) 162/77   Pulse 63   Temp 36.7 °C (98 °F) (Oral)   Resp 18   Ht 5' 5" (1.651 m)   Wt 66.5 kg (146 lb 9.7 oz)   SpO2 98%   Breastfeeding No   BMI 24.40 kg/m²     "

## 2022-10-17 NOTE — ANESTHESIA PREPROCEDURE EVALUATION
10/16/2022  Greta Chun is a 72 y.o., female , who presents for the following:    Procedure: Injection,steroid,epidural,transforaminal approach Right L4 and L5 (Right: Back) - L4 and L5   Anesthesia type: Local MAC   Diagnosis:        Lumbar degenerative disc disease [M51.36]       Disc displacement, lumbar [M51.26]       Lumbar radiculitis [M54.16]   Pre-op diagnosis:        Lumbar degenerative disc disease [M51.36]       Disc displacement, lumbar [M51.26]       Lumbar radiculitis [M54.16]   Location: [facility] VIRTUAL PROCEDURAL ROOM / [facility] OR   Surgeons: Lisa Concepcion MD       Pre-op Assessment    I have reviewed the Patient Summary Reports.    I have reviewed the NPO Status.   I have reviewed the Medications.     Review of Systems  Anesthesia Hx:  No problems with previous Anesthesia    Social:  Non-Smoker    Cardiovascular:   Hypertension    Pulmonary:  Pulmonary Normal    Hepatic/GI:  Hepatic/GI Normal    Musculoskeletal:   Arthritis     Endocrine:   Borderline DM       Physical Exam  General: Alert and Oriented    Airway:  Mallampati: II   Mouth Opening: Normal  TM Distance: 4 - 6 cm  Tongue: Normal  Neck ROM: Normal ROM    Chest/Lungs:  Normal Respiratory Rate    Heart:  Rate: Normal  Rhythm: Regular Rhythm        Anesthesia Plan  Type of Anesthesia, risks & benefits discussed:    Anesthesia Type: MAC  Intra-op Monitoring Plan: Standard ASA Monitors  Induction:  IV  Airway Plan: , Post-Induction  Informed Consent: Informed consent signed with the Patient and all parties understand the risks and agree with anesthesia plan.  All questions answered. Patient consented to blood products? No  ASA Score: 2  Day of Surgery Review of History & Physical: H&P Update referred to the surgeon/provider.  Anesthesia Plan Notes: Nasal Cannula vs O2 Via Facemask  TIVA    Ready For Surgery From Anesthesia Perspective.      .

## 2022-10-20 VITALS
SYSTOLIC BLOOD PRESSURE: 143 MMHG | BODY MASS INDEX: 24.43 KG/M2 | WEIGHT: 146.63 LBS | HEIGHT: 65 IN | DIASTOLIC BLOOD PRESSURE: 81 MMHG | RESPIRATION RATE: 18 BRPM | OXYGEN SATURATION: 98 % | TEMPERATURE: 98 F | HEART RATE: 59 BPM

## 2022-10-28 ENCOUNTER — OFFICE VISIT (OUTPATIENT)
Dept: ORTHOPEDICS | Facility: CLINIC | Age: 72
End: 2022-10-28
Payer: MEDICARE

## 2022-10-28 VITALS
HEIGHT: 65 IN | BODY MASS INDEX: 24.32 KG/M2 | WEIGHT: 146 LBS | HEART RATE: 60 BPM | DIASTOLIC BLOOD PRESSURE: 85 MMHG | SYSTOLIC BLOOD PRESSURE: 147 MMHG

## 2022-10-28 DIAGNOSIS — M54.9 CHRONIC BACK PAIN GREATER THAN 3 MONTHS DURATION: ICD-10-CM

## 2022-10-28 DIAGNOSIS — M51.36 LUMBAR DEGENERATIVE DISC DISEASE: Primary | ICD-10-CM

## 2022-10-28 DIAGNOSIS — M54.16 LUMBAR RADICULITIS: ICD-10-CM

## 2022-10-28 DIAGNOSIS — G89.29 CHRONIC BACK PAIN GREATER THAN 3 MONTHS DURATION: ICD-10-CM

## 2022-10-28 DIAGNOSIS — M51.26 DISC DISPLACEMENT, LUMBAR: ICD-10-CM

## 2022-10-28 PROCEDURE — 99213 OFFICE O/P EST LOW 20 MIN: CPT | Mod: ,,, | Performed by: ANESTHESIOLOGY

## 2022-10-28 PROCEDURE — 99213 PR OFFICE/OUTPT VISIT, EST, LEVL III, 20-29 MIN: ICD-10-PCS | Mod: ,,, | Performed by: ANESTHESIOLOGY

## 2022-10-28 NOTE — PROGRESS NOTES
Lisa Concepcion MD        PATIENT NAME: Greta Chun  : 1950  DATE: 10/28/22  MRN: [id number]      Billing Provider: Lisa Concepcion MD  Level of Service:   Patient PCP Information       Provider PCP Type    Primary Doctor No General            Reason for Visit / Chief Complaint: Post-op Evaluation (Post op injections, TFESI Rt L4 & L5. Patient states injections did get a little relief but not much. States pain is currently 2/10. States yesterday was a rough day. )       Update PCP  Update Chief Complaint         History of Present Illness / Problem Focused Workflow     Greta Chun presents to the clinic with Post-op Evaluation (Post op injections, TFESI Rt L4 & L5. Patient states injections did get a little relief but not much. States pain is currently 2/10. States yesterday was a rough day. )     This is a 72-year-old female who returns to clinic today for follow-up of her chronic low back pain radiating down the right lower extremity.  She underwent right L4 and L5 transforaminal epidural steroid injection  on 22, and again a couple of weeks ago.  She returns today and states that she has gotten a little bit of relief, but not significant.  She is no longer having the pain radiating down the right leg.  She currently rates her pain as 2/10 on the NRS, but had gotten up to 8/10 yesterday.  She has been more active this week than usual.  She did a lot of standing and bending over yesterday which caused her to have increased pain.  She is ready to consider surgery now.  She is going to go back to see Dr. Barrios to discuss her options.    Follow-up      Review of Systems     Review of Systems   Constitutional: Negative.    HENT: Negative.     Respiratory: Negative.     Cardiovascular: Negative.    Gastrointestinal: Negative.    Musculoskeletal:  Positive for back pain and leg pain.   Integumentary:  Negative.   Neurological: Negative.    Hematological: Negative.    Psychiatric/Behavioral: Negative.         Medical / Social / Family History     Past Medical History:   Diagnosis Date    Arthritis     Borderline diabetes     Chronic low back pain     Hypertension     Overactive bladder     Tremors of nervous system     x 25 yrs    Watery eyes        Past Surgical History:   Procedure Laterality Date    BACK SURGERY      CARPAL TUNNEL RELEASE Right     cholecysectomy      epidural steroid injections      SURGICAL REMOVAL OF BONE SPUR      TRANSFORAMINAL EPIDURAL INJECTION OF STEROID Right 08/01/2022    Procedure: INJECTION, STEROID, EPIDURAL, TRANSFORAMINAL APPROACH Right L4 & L5 TFESI;  Surgeon: Lisa Concepcion MD;  Location: [facility] OR;  Service: Pain Management;  Laterality: Right;  Right L4 & L5 TFESI    TRANSFORAMINAL EPIDURAL INJECTION OF STEROID Right 10/17/2022    Procedure: Injection,steroid,epidural,transforaminal approach Right L4 and L5;  Surgeon: Lisa Concepcion MD;  Location: [facility] OR;  Service: Pain Management;  Laterality: Right;  L4 and L5    TUBAL LIGATION         Social History  Ms. Chun  reports that she has never smoked. She has never used smokeless tobacco. She reports that she does not currently use alcohol. She reports that she does not use drugs.    Family History  Ms.'s Chun family history includes Dementia in her father and mother; Hypertension in her father and mother; Stroke in her mother.    Medications and Allergies     Medications  No outpatient medications have been marked as taking for the 10/28/22 encounter (Office Visit) with Lisa Concepcion MD.       Allergies  Review of patient's allergies indicates:   Allergen Reactions    Celebrex [celecoxib] Swelling     Facial swelling       Physical Examination     Vitals:    10/28/22 1011   BP: (!) 147/85   Pulse: 60     Spine Musculoskeletal Exam    Gait    Gait is normal.    Inspection    Thoracolumbar    Thoracolumbar inspection is normal.    Strength    Thoracolumbar    Thoracolumbar motor exam is normal.       Sensory     Thoracolumbar    Thoracolumbar sensation is normal.    General      Constitutional: appears stated age, well-developed and well-nourished    Scleral icterus: no    Labored breathing: no    Psychiatric: normal mood and affect and no acute distress    Neurological: alert and oriented x3    Skin: intact     Assessment and Plan (including Health Maintenance)      Problem List  Smart Sets  Document Outside HM   :    Plan:   Lumbar degenerative disc disease    Disc displacement, lumbar    Chronic back pain greater than 3 months duration    Lumbar radiculitis    She did not get significant relief after her last injection a couple weeks ago.  She is ready to consider surgery and is going to make an appointment to go back and see Dr. Ben Barrios to discuss her options.  She may return here as needed.    Problem List Items Addressed This Visit          Orthopedic Problems    Lumbar degenerative disc disease - Primary    Disc displacement, lumbar       Other    Chronic back pain greater than 3 months duration    Lumbar radiculitis         No future appointments.       There are no Patient Instructions on file for this visit.  No follow-ups on file.     Signature:  Lisa Concepcion MD      Date of encounter: 10/28/22

## 2023-01-23 ENCOUNTER — OFFICE VISIT (OUTPATIENT)
Dept: UROGYNECOLOGY | Facility: CLINIC | Age: 73
End: 2023-01-23
Payer: MEDICARE

## 2023-01-23 VITALS
RESPIRATION RATE: 20 BRPM | HEART RATE: 71 BPM | DIASTOLIC BLOOD PRESSURE: 84 MMHG | HEIGHT: 65 IN | WEIGHT: 146 LBS | BODY MASS INDEX: 24.32 KG/M2 | SYSTOLIC BLOOD PRESSURE: 149 MMHG | TEMPERATURE: 98 F

## 2023-01-23 DIAGNOSIS — N32.81 OAB (OVERACTIVE BLADDER): Primary | ICD-10-CM

## 2023-01-23 DIAGNOSIS — N81.10 PROLAPSE OF ANTERIOR VAGINAL WALL: ICD-10-CM

## 2023-01-23 DIAGNOSIS — N39.3 STRESS INCONTINENCE: ICD-10-CM

## 2023-01-23 DIAGNOSIS — N39.41 URGE INCONTINENCE: ICD-10-CM

## 2023-01-23 DIAGNOSIS — N95.2 VAGINAL ATROPHY: ICD-10-CM

## 2023-01-23 LAB
APPEARANCE UR: CLEAR
BACTERIA #/AREA URNS AUTO: NORMAL /HPF
BILIRUB UR QL STRIP.AUTO: NEGATIVE MG/DL
COLOR UR AUTO: YELLOW
GLUCOSE UR QL STRIP.AUTO: NEGATIVE MG/DL
KETONES UR QL STRIP.AUTO: NEGATIVE MG/DL
LEUKOCYTE ESTERASE UR QL STRIP.AUTO: ABNORMAL UNIT/L
NITRITE UR QL STRIP.AUTO: NEGATIVE
PH UR STRIP.AUTO: 8 [PH]
PROT UR QL STRIP.AUTO: NEGATIVE MG/DL
RBC #/AREA URNS AUTO: <5 /HPF
RBC UR QL AUTO: NEGATIVE UNIT/L
SP GR UR STRIP.AUTO: 1.01 (ref 1–1.03)
SQUAMOUS #/AREA URNS AUTO: <5 /HPF
UROBILINOGEN UR STRIP-ACNC: 0.2 MG/DL
WBC #/AREA URNS AUTO: <5 /HPF

## 2023-01-23 PROCEDURE — 99205 OFFICE O/P NEW HI 60 MIN: CPT | Mod: S$GLB,,, | Performed by: OBSTETRICS & GYNECOLOGY

## 2023-01-23 PROCEDURE — 81003 URINALYSIS AUTO W/O SCOPE: CPT | Performed by: OBSTETRICS & GYNECOLOGY

## 2023-01-23 PROCEDURE — 99205 PR OFFICE/OUTPT VISIT, NEW, LEVL V, 60-74 MIN: ICD-10-PCS | Mod: S$GLB,,, | Performed by: OBSTETRICS & GYNECOLOGY

## 2023-01-23 RX ORDER — POLYETHYLENE GLYCOL 3350 17 G/17G
17 POWDER, FOR SOLUTION ORAL DAILY
COMMUNITY

## 2023-01-23 RX ORDER — ESTRADIOL 0.1 MG/G
CREAM VAGINAL
Qty: 42.5 G | Refills: 10 | Status: SHIPPED | OUTPATIENT
Start: 2023-01-23 | End: 2023-01-24 | Stop reason: SDUPTHER

## 2023-01-23 RX ORDER — MINERAL OIL
180 ENEMA (ML) RECTAL DAILY
COMMUNITY

## 2023-01-23 NOTE — PROGRESS NOTES
PELVIC MEDICINE AND RECONSTRUCTIVE SURGERY CONSULT NOTE    CHIEF COMPLAINT:  Consultation requested for regarding incontinence    HISTORY OF PRESENT ILLNESS:   Greta Chun is a 73 y.o. female  Patient reports first noticing symptoms at least 5 years ago.        Previously tried Myrbetriq and Vesicare.  Neither worked for her  She states she has a full bladder that she empties 3-4 times per night  She also has urinary hesitancy at times and has to wait to initiate stream, especially when the bladder is full  She states her urinary stream is slow and dribbles at times    Voids during the day: q1-2h  Voids at nighttime: 4-5 times (full bladder each time)  Leakage of urine with coughing or exercise: Yes - but not too often  -previous surgery? No  Leakage of urine with urgency: Yes - small leak (1 min to get to the bathroom)  Pad for leakage of urine:no   -how often do you change your pad? N/A  Sensation of incomplete emptying: YES/NO: no  Splinting to void/BM: no  History of kidney stones No  Hematuria No  > 3 UTIs in 12 months  No  - symptoms with UTI? N/A    Glasses/ounces of water in 1 day: 3-4 bottles  Cups of coffee/tea/soda in 1 day: 1 cup coffee and 1 cup tea  Bowel movements in 1 week: daily  Constipation/straining: yes - uses Miralax  Fecal incontinence: No  Fecal urgency: No  Fecal smearing: No    Patient  denies symptoms of a vaginal bulge.  Size of the bulge: N/A  Bulge becoming progressively worse over time: not applicable  Bulge limits physical activity: not applicable    Tried a pessary: no  Tried Kegels: No  Prior Surgery for prolapse: No  Prior Surgery for incontinence: No    Sexually active: yes  Pain with sex: no  Vaginal dryness: yes  Loss of urine or stool with sexual activity:yes    She denies vaginal bleeding, vaginal discharge, dysuria, hematuria,   Patient also denies abdominal or pelvic pain.    Patient reports back injury or back pain  Patient denies lower extremity numbness, tingling      Gyn  Hx:  Patient reports h/o Normal paps; denies h/o Fibroids, denies h/o Endometriosis, denies h/o Ovarian Cysts, denies h/o abnormal paps, denies h/o LEEP/Cryo  Menopause   No LMP recorded. Patient is postmenopausal.   V3  Obstetric complications? no  Vaginal births? yes  -use of forceps or vacuum? no    OB History    Para Term  AB Living   3 3 0 0 0 3   SAB IAB Ectopic Multiple Live Births   0 0 0 0 3       Review of patient's allergies indicates:   Allergen Reactions    Celebrex [celecoxib] Swelling     Facial swelling          Current Outpatient Medications:     amLODIPine (NORVASC) 5 MG tablet, amlodipine 5 mg tablet  TAKE 1 TABLET BY MOUTH EVERY DAY, Disp: , Rfl:     azelastine (OPTIVAR) 0.05 % ophthalmic solution, azelastine 0.05 % eye drops  INSTILL 1 DROP INTO BOTH EYES TWICE DAILY, Disp: , Rfl:     calcium carbonate (OS-RANJAN) 600 mg calcium (1,500 mg) Tab, Take 600 mg by mouth once., Disp: , Rfl:     fexofenadine (ALLEGRA) 180 MG tablet, Take 180 mg by mouth once daily., Disp: , Rfl:     losartan (COZAAR) 100 MG tablet, losartan 100 mg tablet  TAKE 1 TABLET BY MOUTH EVERY DAY, Disp: , Rfl:     melatonin 10 mg Cap, Take by mouth every evening., Disp: , Rfl:     multivit with min-folic acid 0.4 mg Tab, Take 1 tablet by mouth Daily., Disp: , Rfl:     polyethylene glycol (GLYCOLAX) 17 gram/dose powder, Take 17 g by mouth once daily., Disp: , Rfl:     propranoloL 120 mg Cp24, Take 2 tablets by mouth Daily., Disp: , Rfl:     estradioL (ESTRACE) 0.01 % (0.1 mg/gram) vaginal cream, Use a pea sized amount to the vagina once a night for 14 nights when initiating the medication, then 2-3 times per week.  DO NOT use the applicator., Disp: 42.5 g, Rfl: 10    omeprazole (PRILOSEC) 10 MG capsule, Take 10 mg by mouth once daily., Disp: , Rfl:     Past Medical History:   Diagnosis Date    Arthritis     Borderline diabetes     Chronic low back pain     Hypertension     Overactive bladder     Tremors of nervous  "system     x 25 yrs    Watery eyes        Past Surgical History:   Procedure Laterality Date    BACK SURGERY  2012    CARPAL TUNNEL RELEASE Right 2019    cholecysectomy  2003    epidural steroid injections      SURGICAL REMOVAL OF BONE SPUR  2014    TRANSFORAMINAL EPIDURAL INJECTION OF STEROID Right 08/01/2022    Procedure: INJECTION, STEROID, EPIDURAL, TRANSFORAMINAL APPROACH Right L4 & L5 TFESI;  Surgeon: Lisa Concepcion MD;  Location: [facility] OR;  Service: Pain Management;  Laterality: Right;  Right L4 & L5 TFESI    TRANSFORAMINAL EPIDURAL INJECTION OF STEROID Right 10/17/2022    Procedure: Injection,steroid,epidural,transforaminal approach Right L4 and L5;  Surgeon: Lisa Concepcion MD;  Location: [facility] OR;  Service: Pain Management;  Laterality: Right;  L4 and L5    TUBAL LIGATION  1980       Family History   Problem Relation Age of Onset    Hypertension Mother     Stroke Mother     Dementia Mother     Hypertension Father     Dementia Father         Social History     Tobacco Use    Smoking status: Never    Smokeless tobacco: Never   Substance Use Topics    Alcohol use: Yes     Alcohol/week: 1.0 standard drink     Types: 1 Glasses of wine per week     Comment: occassionally    Drug use: Never        Review of Systems   Psychological ROS: negative  Eyes: Trouble seeing near or far and floaters  ENT ROS: Negative  Neuro ROS: Tremors  Respiratory ROS: Negative  Gastrointestinal ROS: Frequent need for antacids  Cardiovascular ROS: Negative  Genitourinary ROS: Frequent urination, Difficulty emptying bladder, and Waking to urinate  Integumentary ROS: Negative  Musculoskeletal ROS: Muscle cramps/pains in the hands      Physical Exam:   BP (!) 149/84 (BP Location: Left arm, Patient Position: Sitting, BP Method: Medium (Automatic))   Pulse 71   Temp 98.4 °F (36.9 °C) (Oral)   Resp 20   Ht 5' 5" (1.651 m)   Wt 66.2 kg (146 lb)   BMI 24.30 kg/m²   General: No acute distress   Skin: no lesions  Musculoskeletal: normal " lower extremity strength  Sensation to light touch in the lower extremities is normal   Extremities: well perfused with normal pulses in the distal extremities, no peripheral edema noted  Abdominal exam: soft non tender, no palpable masses, no scar  External genitalia: normal female genitalia, no lesions, normal female hair distribution, no clitoral enlargement, nontender, no scars  The perineal reflexes are present  Exam Chaperoned by: Alisson Batista LPN  Vagina/cervix: atrophic vagina, no discharge, exudate, lesion, or erythema  Bimanual exam: Anteverted, no palpable masses, non-tender exam  Urethra: no prolapse, caruncle, absent  Stress test: positive  Post void residual volume: 7 mL    POPQ (Date 23): Aa -0.5 Ba -0.5  C -8.5  GH 3 PB 2.5 TVL 10 Ap -3 Bp -3 D -8.5        ASSESSMENT:  Greta Luent is a 73 y.o.    1. OAB (overactive bladder)    2. Urge incontinence    3. Stress incontinence    4. Vaginal atrophy    5. Prolapse of anterior vaginal wall          PLAN:  The pathophysiology of the above condition has been discussed with the patient who expressed understanding.   We discussed the differences between stress and urge incontinence.    Urge urinary incontinence - We reviewed management options for Urge Incontinence including expectant management, lifestyle and behavioral modifications, Kegel's, PFPT, medications and more invasive treatments such as PTNS, Botox, and Interstim. We reviewed in great detail the success and failure rate associated with each approach. For Botox we discussed complication rates such as voiding dysfunction, need for self catheterization and, more rare complications of muscle weakness remote from Botox injection.  She has already tried Myrbetriq and Vesicare and neither worked for her. May start Trospium, depending on the results of the bladder diary.  Patient's main complaints are getting up at night with a full bladder.  She is on Amlodipine and Losartan which are both  associated with increased urine production as well.  Her nocturia may be associated with being recumbent and the body having access to the fluid in that position.  Will review bladder diary and that should give more insight into what is happening overnight.    Stress urinary incontinence - We reviewed management options for Stress Incontinence including expectant management, lifestyle and behavioral modifications, Kegel's, PFPT, Poise incontinence inserts, pessaries, and more invasive treatments such as mid-urethral and fascial slings. We reviewed in great detail the success and failure rate associated with each approach.  For slings, we discussed complication rates like mesh erosion, post op urinary retention, voiding dysfunction, pain, infection, bleeding and the expected recovery after a sling procedure.  Would lean towards urethral bulking if she desires intervention.    We explained the possible need for urodynamic testing to better clarify the nature of her voiding complaints.     Pelvic Organ Prolapse: Etiology of disease reviewed with the patient, emphasizing the benign nature of it.  She was counseled regarding management strategies including: expectant management, pelvic floor physical therapy, weight loss, Kegel's, avoidance of constipation and heavy lifting, and pessary placement.  Surgical management with and without hysterectomy also reviewed.   Patient has: Stage 2  Patient chooses: conservative management. It is not bothersome at this time.  However, if her slow emptying is due to urethral kinking, will address this in the future.  Vaginal estrogen cream prescribed for vaginal health.       Vaginal atrophy - Encouraged use of vaginal estrogen as it will restore some elasticity to the vagina and has been shown to decrease the rate of recurrent UTIs, decrease vaginal pH, and increase vaginal lactobacillus.  A pea-sized amount to the vagina every night for 14 days, then 2-3 times per week.  Handout  given on low dose vaginal estrogen.      At the time the patient desires to proceed:  Bladder diary  UDS  Estrace    Handouts provided on above diagnosis    Follow up 2 weeks    Orders Placed This Encounter    Urinalysis, Reflex to Urine Culture    estradioL (ESTRACE) 0.01 % (0.1 mg/gram) vaginal cream        ALEJANDRA Toledo MD  Pelvic Medicine and Reconstructive Surgery  Urogynecology  [facility]      70 minutes was spent face to face with patient >50% of the time was spent in counseling and coordination of care.

## 2023-01-24 DIAGNOSIS — N95.2 VAGINAL ATROPHY: ICD-10-CM

## 2023-01-24 RX ORDER — ESTRADIOL 0.1 MG/G
CREAM VAGINAL
Qty: 42.5 G | Refills: 10 | Status: SHIPPED | OUTPATIENT
Start: 2023-01-24

## 2023-02-06 ENCOUNTER — OFFICE VISIT (OUTPATIENT)
Dept: UROGYNECOLOGY | Facility: CLINIC | Age: 73
End: 2023-02-06
Payer: MEDICARE

## 2023-02-06 DIAGNOSIS — N95.2 VAGINAL ATROPHY: ICD-10-CM

## 2023-02-06 DIAGNOSIS — N39.3 STRESS INCONTINENCE: ICD-10-CM

## 2023-02-06 DIAGNOSIS — R35.1 NOCTURIA: Primary | ICD-10-CM

## 2023-02-06 DIAGNOSIS — N39.41 URGE INCONTINENCE: ICD-10-CM

## 2023-02-06 DIAGNOSIS — N81.10 PROLAPSE OF ANTERIOR VAGINAL WALL: ICD-10-CM

## 2023-02-06 PROCEDURE — 99214 OFFICE O/P EST MOD 30 MIN: CPT | Mod: S$GLB,,, | Performed by: OBSTETRICS & GYNECOLOGY

## 2023-02-06 PROCEDURE — 99214 PR OFFICE/OUTPT VISIT, EST, LEVL IV, 30-39 MIN: ICD-10-PCS | Mod: S$GLB,,, | Performed by: OBSTETRICS & GYNECOLOGY

## 2023-02-06 NOTE — PROGRESS NOTES
-PELVIC MEDICINE AND RECONSTRUCTIVE SURGERY FOLLOW UP NOTE    CHIEF COMPLAINT:  Follow up for nocturia and incontinence    HISTORY OF PRESENT ILLNESS:   Greta Chun is a 73 y.o. female  Patient reports first noticing symptoms at least 5 years ago.        Previously tried Myrbetriq and Vesicare.  Neither worked for her  She states she has a full bladder that she empties 3-4 times per night  She also has urinary hesitancy at times and has to wait to initiate stream, especially when the bladder is full  She states her urinary stream is slow and dribbles at times    Voids during the day: q1-2h  Voids at nighttime: 4-5 times (full bladder each time)  Leakage of urine with coughing or exercise: Yes - but not too often  -previous surgery? No  Leakage of urine with urgency: Yes - small leak (1 min to get to the bathroom)  Pad for leakage of urine:no   -how often do you change your pad? N/A  Sensation of incomplete emptying: YES/NO: no  Splinting to void/BM: no  History of kidney stones No  Hematuria No  > 3 UTIs in 12 months  No  - symptoms with UTI? N/A    Glasses/ounces of water in 1 day: 3-4 bottles  Cups of coffee/tea/soda in 1 day: 1 cup coffee and 1 cup tea  Bowel movements in 1 week: daily  Constipation/straining: yes - uses Miralax    Interim Hx: Patient returns with her bladder diary and for response to medication check.  She states she is faithfully using the vaginal estrogen.    Bladder diary:  Day 1: In 1750  Out: 1850   Day 2: In 1650  Out: 2560 (5 out of  9 voids took place between 7:15pm and 6:30am, each 100-500cc)  Day 3: In   750  Out: 2125 (8 out of 10 voids took place between 7:35pm and 6:30 am; each 100-400cc)    Gyn Hx:  Patient reports h/o Normal paps; denies h/o Fibroids, denies h/o Endometriosis, denies h/o Ovarian Cysts, denies h/o abnormal paps, denies h/o LEEP/Cryo  Menopause   No LMP recorded. Patient is postmenopausal.   V3  Obstetric complications? no  Vaginal births? yes  -use of forceps  or vacuum? no    OB History    Para Term  AB Living   3 3 0 0 0 3   SAB IAB Ectopic Multiple Live Births   0 0 0 0 3       Review of patient's allergies indicates:   Allergen Reactions    Celebrex [celecoxib] Swelling     Facial swelling          Current Outpatient Medications:     amLODIPine (NORVASC) 5 MG tablet, amlodipine 5 mg tablet  TAKE 1 TABLET BY MOUTH EVERY DAY, Disp: , Rfl:     azelastine (OPTIVAR) 0.05 % ophthalmic solution, azelastine 0.05 % eye drops  INSTILL 1 DROP INTO BOTH EYES TWICE DAILY, Disp: , Rfl:     calcium carbonate (OS-RANJAN) 600 mg calcium (1,500 mg) Tab, Take 600 mg by mouth once., Disp: , Rfl:     estradioL (ESTRACE) 0.01 % (0.1 mg/gram) vaginal cream, Use a pea sized amount to the vagina once a night for 14 nights when initiating the medication, then 2-3 times per week.  DO NOT use the applicator., Disp: 42.5 g, Rfl: 10    fexofenadine (ALLEGRA) 180 MG tablet, Take 180 mg by mouth once daily., Disp: , Rfl:     losartan (COZAAR) 100 MG tablet, losartan 100 mg tablet  TAKE 1 TABLET BY MOUTH EVERY DAY, Disp: , Rfl:     melatonin 10 mg Cap, Take by mouth every evening., Disp: , Rfl:     multivit with min-folic acid 0.4 mg Tab, Take 1 tablet by mouth Daily., Disp: , Rfl:     omeprazole (PRILOSEC) 10 MG capsule, Take 10 mg by mouth once daily., Disp: , Rfl:     polyethylene glycol (GLYCOLAX) 17 gram/dose powder, Take 17 g by mouth once daily., Disp: , Rfl:     propranoloL 120 mg Cp24, Take 2 tablets by mouth Daily., Disp: , Rfl:     Past Medical History:   Diagnosis Date    Arthritis     Borderline diabetes     Chronic low back pain     Hypertension     Overactive bladder     Tremors of nervous system     x 25 yrs    Watery eyes        Past Surgical History:   Procedure Laterality Date    BACK SURGERY  2012    CARPAL TUNNEL RELEASE Right 2019    cholecysectomy  2003    epidural steroid injections      SURGICAL REMOVAL OF BONE SPUR  2014    TRANSFORAMINAL EPIDURAL INJECTION OF  STEROID Right 08/01/2022    Procedure: INJECTION, STEROID, EPIDURAL, TRANSFORAMINAL APPROACH Right L4 & L5 TFESI;  Surgeon: Lisa Concepcion MD;  Location: [facility] OR;  Service: Pain Management;  Laterality: Right;  Right L4 & L5 TFESI    TRANSFORAMINAL EPIDURAL INJECTION OF STEROID Right 10/17/2022    Procedure: Injection,steroid,epidural,transforaminal approach Right L4 and L5;  Surgeon: Lisa Concepcion MD;  Location: [facility] OR;  Service: Pain Management;  Laterality: Right;  L4 and L5    TUBAL LIGATION  1980       Family History   Problem Relation Age of Onset    Hypertension Mother     Stroke Mother     Dementia Mother     Hypertension Father     Dementia Father         Social History     Tobacco Use    Smoking status: Never    Smokeless tobacco: Never   Substance Use Topics    Alcohol use: Yes     Alcohol/week: 1.0 standard drink     Types: 1 Glasses of wine per week     Comment: occassionally    Drug use: Never        Review of Systems   Psychological ROS: negative  Eyes: Trouble seeing near or far and floaters  ENT ROS: Negative  Neuro ROS: Tremors  Respiratory ROS: Negative  Gastrointestinal ROS: Frequent need for antacids  Cardiovascular ROS: Negative  Genitourinary ROS: Frequent urination, Difficulty emptying bladder, and Waking to urinate  Integumentary ROS: Negative  Musculoskeletal ROS: Muscle cramps/pains in the hands      Physical Exam:   There were no vitals taken for this visit.  General: No acute distress   Skin: no lesions  Musculoskeletal: normal lower extremity strength  Sensation to light touch in the lower extremities is normal   Extremities: well perfused with normal pulses in the distal extremities, no peripheral edema noted  Abdominal exam: soft non tender, no palpable masses, no scar  External genitalia: normal female genitalia, no lesions, normal female hair distribution, no clitoral enlargement, nontender, no scars  The perineal reflexes are present  Exam Chaperoned by: Alisson Batista  LPN  Vagina/cervix: atrophic but greatly improved from previous, pink and vascularized vagina, no discharge, exudate, lesion, or erythema  Bimanual exam: Anteverted, no palpable masses, non-tender exam  Urethra: no prolapse, caruncle, absent    POPQ (Date 23): Aa -0.5 Ba -0.5  C -8.5  GH 3 PB 2.5 TVL 10 Ap -3 Bp -3 D -8.5          ASSESSMENT:  Greta Luent is a 73 y.o.    1. Nocturia    2. Urge incontinence    3. Stress incontinence    4. Prolapse of anterior vaginal wall    5. Vaginal atrophy            PLAN:      Nocturia/Urge urinary incontinence - We reviewed management options for Urge Incontinence including expectant management, lifestyle and behavioral modifications, Kegel's, PFPT, medications and more invasive treatments such as PTNS, Botox, and Interstim. We reviewed in great detail the success and failure rate associated with each approach. For Botox we discussed complication rates such as voiding dysfunction, need for self catheterization and, more rare complications of muscle weakness remote from Botox injection.  She has already tried Myrbetriq and Vesicare and neither worked for her. May start Trospium, depending on the results of the bladder diary.  Patient's main complaints are getting up at night with a full bladder.  She is on Amlodipine and Losartan which are both associated with increased urine production as well.  Her nocturia may be associated with being recumbent and the body having access to the fluid in that position.  Will review bladder diary and that should give more insight into what is happening overnight.    Bladder diary reviewed.  Patient is emptying the majority of her bladder at night.  On Day 2 she emptied almost 2 liters between 6pm and 4am.  She takes her diuretic at 7am.  Her not undergoing diuresis until at night may be a result of her lying recumbent and now the body has access to this fluid.  Will recommend compression stockings during the day.  Also, she may be  processing the medication in a delayed fashion.  Since she doesn't really urinate during the day, she may need to take the medication at night and this may prompt it working during the day.  This also may be due to a deficit of ADH, but would wait until other options exhausted before giving desmopressin.  Will need a CMP and GFR.  Last one was done in 2020.    Stress urinary incontinence - We reviewed management options for Stress Incontinence including expectant management, lifestyle and behavioral modifications, Kegel's, PFPT, Poise incontinence inserts, pessaries, and more invasive treatments such as mid-urethral and fascial slings. We reviewed in great detail the success and failure rate associated with each approach.  For slings, we discussed complication rates like mesh erosion, post op urinary retention, voiding dysfunction, pain, infection, bleeding and the expected recovery after a sling procedure.  Would lean towards urethral bulking if she desires intervention.    We explained the possible need for urodynamic testing to better clarify the nature of her voiding complaints.     Pelvic Organ Prolapse: Etiology of disease reviewed with the patient, emphasizing the benign nature of it.  She was counseled regarding management strategies including: expectant management, pelvic floor physical therapy, weight loss, Kegel's, avoidance of constipation and heavy lifting, and pessary placement.  Surgical management with and without hysterectomy also reviewed.   Patient has: Stage 2  Patient chooses: conservative management. It is not bothersome at this time.  However, if her slow emptying is due to urethral kinking, will address this in the future.  Vaginal estrogen cream prescribed for vaginal health.       Vaginal atrophy - Encouraged use of vaginal estrogen as it will restore some elasticity to the vagina and has been shown to decrease the rate of recurrent UTIs, decrease vaginal pH, and increase vaginal  lactobacillus.  A pea-sized amount to the vagina every night for 14 days, then 2-3 times per week.  Handout given on low dose vaginal estrogen.    -Continue vaginal estrogen    At the time the patient desires to proceed:  Compression stockings  Take diuretic before going to bed  Order CMP with GFR  UDS scheduled for March 1st  Continue Estrace    Orders Placed This Encounter    Comprehensive Metabolic Panel        Follow up with [facility]DT on March 1st      ALEJANDRA Toledo MD  Pelvic Medicine and Reconstructive Surgery  Urogynecology  [facility]

## 2023-02-22 DIAGNOSIS — N39.41 URGE INCONTINENCE: Primary | ICD-10-CM

## 2023-02-22 DIAGNOSIS — N81.9 FEMALE GENITAL PROLAPSE, UNSPECIFIED TYPE: ICD-10-CM

## 2023-02-22 DIAGNOSIS — N39.3 STRESS INCONTINENCE: ICD-10-CM

## 2023-02-24 ENCOUNTER — LAB VISIT (OUTPATIENT)
Dept: LAB | Facility: HOSPITAL | Age: 73
End: 2023-02-24
Attending: OBSTETRICS & GYNECOLOGY
Payer: MEDICARE

## 2023-02-24 DIAGNOSIS — R35.1 NOCTURIA: ICD-10-CM

## 2023-02-24 DIAGNOSIS — Z01.818 PREOPERATIVE EXAMINATION, UNSPECIFIED: ICD-10-CM

## 2023-02-24 LAB
ALBUMIN SERPL-MCNC: 4.1 G/DL (ref 3.4–4.8)
ALBUMIN/GLOB SERPL: 1.2 RATIO (ref 1.1–2)
ALP SERPL-CCNC: 81 UNIT/L (ref 40–150)
ALT SERPL-CCNC: 41 UNIT/L (ref 0–55)
AST SERPL-CCNC: 30 UNIT/L (ref 5–34)
BILIRUBIN DIRECT+TOT PNL SERPL-MCNC: 0.8 MG/DL
BUN SERPL-MCNC: 22.7 MG/DL (ref 9.8–20.1)
CALCIUM SERPL-MCNC: 9.4 MG/DL (ref 8.4–10.2)
CHLORIDE SERPL-SCNC: 105 MMOL/L (ref 98–107)
CO2 SERPL-SCNC: 29 MMOL/L (ref 23–31)
CREAT SERPL-MCNC: 0.86 MG/DL (ref 0.55–1.02)
GFR SERPLBLD CREATININE-BSD FMLA CKD-EPI: >60 MLS/MIN/1.73/M2
GLOBULIN SER-MCNC: 3.4 GM/DL (ref 2.4–3.5)
GLUCOSE SERPL-MCNC: 77 MG/DL (ref 82–115)
POTASSIUM SERPL-SCNC: 4.2 MMOL/L (ref 3.5–5.1)
PROT SERPL-MCNC: 7.5 GM/DL (ref 5.8–7.6)
SODIUM SERPL-SCNC: 138 MMOL/L (ref 136–145)

## 2023-02-24 PROCEDURE — 80053 COMPREHEN METABOLIC PANEL: CPT

## 2023-02-24 PROCEDURE — 93010 EKG 12-LEAD: ICD-10-PCS | Mod: ,,, | Performed by: STUDENT IN AN ORGANIZED HEALTH CARE EDUCATION/TRAINING PROGRAM

## 2023-02-24 PROCEDURE — 93010 ELECTROCARDIOGRAM REPORT: CPT | Mod: ,,, | Performed by: STUDENT IN AN ORGANIZED HEALTH CARE EDUCATION/TRAINING PROGRAM

## 2023-02-24 PROCEDURE — 93005 ELECTROCARDIOGRAM TRACING: CPT

## 2023-02-24 PROCEDURE — 36415 COLL VENOUS BLD VENIPUNCTURE: CPT

## 2023-02-27 ENCOUNTER — OFFICE VISIT (OUTPATIENT)
Dept: UROGYNECOLOGY | Facility: CLINIC | Age: 73
End: 2023-02-27
Payer: MEDICARE

## 2023-02-27 DIAGNOSIS — N95.2 VAGINAL ATROPHY: ICD-10-CM

## 2023-02-27 DIAGNOSIS — N39.41 URGE INCONTINENCE: ICD-10-CM

## 2023-02-27 DIAGNOSIS — R35.1 NOCTURIA: Primary | ICD-10-CM

## 2023-02-27 DIAGNOSIS — N39.3 STRESS INCONTINENCE: ICD-10-CM

## 2023-02-27 DIAGNOSIS — N81.10 PROLAPSE OF ANTERIOR VAGINAL WALL: ICD-10-CM

## 2023-02-27 PROCEDURE — 99443 PR PHYSICIAN TELEPHONE EVALUATION 21-30 MIN: CPT | Mod: 95,S$GLB,, | Performed by: OBSTETRICS & GYNECOLOGY

## 2023-02-27 PROCEDURE — 99443 PR PHYSICIAN TELEPHONE EVALUATION 21-30 MIN: ICD-10-PCS | Mod: 95,S$GLB,, | Performed by: OBSTETRICS & GYNECOLOGY

## 2023-03-01 ENCOUNTER — HOSPITAL ENCOUNTER (OUTPATIENT)
Facility: HOSPITAL | Age: 73
Discharge: HOME | End: 2023-03-01
Attending: OBSTETRICS & GYNECOLOGY | Admitting: OBSTETRICS & GYNECOLOGY
Payer: MEDICARE

## 2023-03-01 DIAGNOSIS — Z01.818 PREOPERATIVE EXAMINATION, UNSPECIFIED: Primary | ICD-10-CM

## 2023-03-01 PROCEDURE — 51728 CYSTOMETROGRAM W/VP: CPT | Mod: 26,,, | Performed by: OBSTETRICS & GYNECOLOGY

## 2023-03-01 PROCEDURE — 51784 ANAL/URINARY MUSCLE STUDY: CPT | Mod: 26,,, | Performed by: OBSTETRICS & GYNECOLOGY

## 2023-03-01 PROCEDURE — 51741 PR UROFLOWMETRY, COMPLEX: ICD-10-PCS | Mod: 26,,, | Performed by: OBSTETRICS & GYNECOLOGY

## 2023-03-01 PROCEDURE — 71000015 HC POSTOP RECOV 1ST HR: Performed by: OBSTETRICS & GYNECOLOGY

## 2023-03-01 PROCEDURE — 51741 ELECTRO-UROFLOWMETRY FIRST: CPT | Mod: 26,,, | Performed by: OBSTETRICS & GYNECOLOGY

## 2023-03-01 PROCEDURE — 51784 PR ANAL/URINARY MUSCLE STUDY: ICD-10-PCS | Mod: 26,,, | Performed by: OBSTETRICS & GYNECOLOGY

## 2023-03-01 PROCEDURE — 36000704 HC OR TIME LEV I 1ST 15 MIN: Performed by: OBSTETRICS & GYNECOLOGY

## 2023-03-01 PROCEDURE — 51728 PR COMPLEX CYSTOMETROGRAM VOIDING PRESSURE STUDIES: ICD-10-PCS | Mod: 26,,, | Performed by: OBSTETRICS & GYNECOLOGY

## 2023-03-01 PROCEDURE — 51797 INTRAABDOMINAL PRESSURE TEST: CPT | Mod: 26,,, | Performed by: OBSTETRICS & GYNECOLOGY

## 2023-03-01 PROCEDURE — 36000705 HC OR TIME LEV I EA ADD 15 MIN: Performed by: OBSTETRICS & GYNECOLOGY

## 2023-03-01 PROCEDURE — 51797 PR VOIDING PRESS STUDY INTRA-ABDOMINAL VOID: ICD-10-PCS | Mod: 26,,, | Performed by: OBSTETRICS & GYNECOLOGY

## 2023-03-01 NOTE — PROCEDURES
Urodynamic Study    Procedures: Uroflow, Cystometrogram, Pressure voiding Study, Pelvic EMG       Physician: Amanda Toledo MD    Technician: Alisson Batista LPN      Detail of the Procedure:    Multichannel Urodynamic procedure was performed using [employer] equipment. Dual lumen 7 Fr urethral catheter was used for bladder pressure measurement. Abdominal Catheter was used placed in the vagina. Subtracted detrusor pressure measurement and pelvic EMG were measured continuously.  Omnipaque was infused at a rate of 25 ml/min. Up to 100 mL, then switched to normal saline. Filling, resting and provocative cystometry were performed in seated position.  The urinary catheter was removed as patient was unable to void initially. She was eventually able to void for the final uroflow. When the study was complete, all catheters and electrodes were removed.  A PVR was performed under sterile conditions and recorded.  Once complete, patient was then able to stand dry herself.  Patient tolerated procedure well.  Patient to follow up in clinic next week.    Uroflow:    The patient void curve was flat, low velocity    Voiding pattern: abnormal    Voided Volume: 139.6 mL ml    Max Uroflow: 3.2 ml/sec    Initial PVR: 70 ml.    Cystometry:    Both resting and provocative cystometry was performed at a filling rate of 50 ml/ min. It revealed:    Bladder sensations               First sensation of bladder filling = 359 ml (nl  ml)               First desire to void = 373 ml. (nl 200-400 ml)               Strong desire to void = 507 ml. (nl 300-600 ml)    2. Abdominal Valsalva/Cough LPP:    144 cc 12 mmHg; Leaked no    260 cc 14 mmHg; Leaked no    359 cc 10 mmHg; Leaked no    451 cc 20 mmHg; Leaked no    3. Maximum bladder capacity = 681 ml (nl 300 - 600 ml)    4. Bladder compliance =  > 40ml/cmH2O (normal)    Bladder voiding efficiency = 58.7%      During filling phase, systolic detrusor contractions not seen.    Max filling P det  = 53 mmHg     Voiding cystometry    Q Max 6 ml/sec    Q Avg  2.3 ml/sec.    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    ALEJANDRA Toledo MD  Pelvic Medicine and Reconstructive Surgery  Urogynecology  [facility]

## 2023-03-01 NOTE — H&P
-PELVIC MEDICINE AND RECONSTRUCTIVE SURGERY FOLLOW UP NOTE    CHIEF COMPLAINT:  Follow up for nocturia and incontinence    HISTORY OF PRESENT ILLNESS:   Greta Chun is a 73 y.o. female  Patient reports first noticing symptoms at least 5 years ago.        Previously tried Myrbetriq and Vesicare.  Neither worked for her  She states she has a full bladder that she empties 3-4 times per night  She also has urinary hesitancy at times and has to wait to initiate stream, especially when the bladder is full  She states her urinary stream is slow and dribbles at times    Voids during the day: q1-2h  Voids at nighttime: 4-5 times (full bladder each time)  Leakage of urine with coughing or exercise: Yes - but not too often  -previous surgery? No  Leakage of urine with urgency: Yes - small leak (1 min to get to the bathroom)  Pad for leakage of urine:no   -how often do you change your pad? N/A  Sensation of incomplete emptying: YES/NO: no  Splinting to void/BM: no  History of kidney stones No  Hematuria No  > 3 UTIs in 12 months  No  - symptoms with UTI? N/A    Glasses/ounces of water in 1 day: 3-4 bottles  Cups of coffee/tea/soda in 1 day: 1 cup coffee and 1 cup tea  Bowel movements in 1 week: daily  Constipation/straining: yes - uses Miralax    Interim Hx: Patient presents for discussion of upcoming procedure.    Bladder diary:  Day 1: In 175  Out: 1850   Day 2: In 1650  Out: 2560 (5 out of  9 voids took place between 7:15pm and 6:30am, each 100-500cc)  Day 3: In     Out: 2125 (8 out of 10 voids took place between 7:35pm and 6:30 am; each 100-400cc)    Gyn Hx:  Patient reports h/o Normal paps; denies h/o Fibroids, denies h/o Endometriosis, denies h/o Ovarian Cysts, denies h/o abnormal paps, denies h/o LEEP/Cryo  Menopause   No LMP recorded. Patient is postmenopausal.   V3  Obstetric complications? no  Vaginal births? yes  -use of forceps or vacuum? no    OB History    Para Term  AB Living   3 3 0 0 0 3    SAB IAB Ectopic Multiple Live Births   0 0 0 0 3       Review of patient's allergies indicates:   Allergen Reactions    Celebrex [celecoxib] Swelling     Facial swelling        No current facility-administered medications for this encounter.    Current Outpatient Medications:     amLODIPine (NORVASC) 5 MG tablet, amlodipine 5 mg tablet  TAKE 1 TABLET BY MOUTH EVERY DAY, Disp: , Rfl:     azelastine (OPTIVAR) 0.05 % ophthalmic solution, azelastine 0.05 % eye drops  INSTILL 1 DROP INTO BOTH EYES TWICE DAILY, Disp: , Rfl:     calcium carbonate (OS-RANJAN) 600 mg calcium (1,500 mg) Tab, Take 600 mg by mouth once., Disp: , Rfl:     estradioL (ESTRACE) 0.01 % (0.1 mg/gram) vaginal cream, Use a pea sized amount to the vagina once a night for 14 nights when initiating the medication, then 2-3 times per week.  DO NOT use the applicator., Disp: 42.5 g, Rfl: 10    fexofenadine (ALLEGRA) 180 MG tablet, Take 180 mg by mouth once daily., Disp: , Rfl:     losartan (COZAAR) 100 MG tablet, losartan 100 mg tablet  TAKE 1 TABLET BY MOUTH EVERY DAY, Disp: , Rfl:     melatonin 10 mg Cap, Take by mouth every evening., Disp: , Rfl:     multivit with min-folic acid 0.4 mg Tab, Take 1 tablet by mouth Daily., Disp: , Rfl:     omeprazole (PRILOSEC) 10 MG capsule, Take 10 mg by mouth once daily., Disp: , Rfl:     polyethylene glycol (GLYCOLAX) 17 gram/dose powder, Take 17 g by mouth once daily., Disp: , Rfl:     propranoloL 120 mg Cp24, Take 2 tablets by mouth Daily., Disp: , Rfl:     Past Medical History:   Diagnosis Date    Arthritis     Borderline diabetes     Chronic low back pain     Hypertension     Overactive bladder     Tremors of nervous system     x 25 yrs    Watery eyes        Past Surgical History:   Procedure Laterality Date    BACK SURGERY  2012    CARPAL TUNNEL RELEASE Right 2019    cholecysectomy  2003    epidural steroid injections      SURGICAL REMOVAL OF BONE SPUR  2014    TOE SURGERY Left     TRANSFORAMINAL EPIDURAL INJECTION  "OF STEROID Right 08/01/2022    Procedure: INJECTION, STEROID, EPIDURAL, TRANSFORAMINAL APPROACH Right L4 & L5 TFESI;  Surgeon: Lisa Concepcion MD;  Location: [facility] OR;  Service: Pain Management;  Laterality: Right;  Right L4 & L5 TFESI    TRANSFORAMINAL EPIDURAL INJECTION OF STEROID Right 10/17/2022    Procedure: Injection,steroid,epidural,transforaminal approach Right L4 and L5;  Surgeon: Lisa Concepcion MD;  Location: [facility] OR;  Service: Pain Management;  Laterality: Right;  L4 and L5    TUBAL LIGATION  1980       Family History   Problem Relation Age of Onset    Hypertension Mother     Stroke Mother     Dementia Mother     Hypertension Father     Dementia Father         Social History     Tobacco Use    Smoking status: Never    Smokeless tobacco: Never   Substance Use Topics    Alcohol use: Yes     Alcohol/week: 1.0 standard drink     Types: 1 Glasses of wine per week     Comment: occassionally    Drug use: Never        Review of Systems   Psychological ROS: negative  Eyes: Trouble seeing near or far and floaters  ENT ROS: Negative  Neuro ROS: Tremors  Respiratory ROS: Negative  Gastrointestinal ROS: Frequent need for antacids  Cardiovascular ROS: Negative  Genitourinary ROS: Frequent urination, Difficulty emptying bladder, and Waking to urinate  Integumentary ROS: Negative  Musculoskeletal ROS: Muscle cramps/pains in the hands      Physical Exam:   BP (!) 165/88   Pulse 63   Temp 98.3 °F (36.8 °C)   Resp 18   Ht 5' 5" (1.651 m)   Wt 67.4 kg (148 lb 9.4 oz)   SpO2 99%   Breastfeeding No   BMI 24.73 kg/m²   General: No acute distress   Skin: no lesions  CV: RRR  Chest: clear  Musculoskeletal: normal lower extremity strength  Sensation to light touch in the lower extremities is normal   Extremities: well perfused with normal pulses in the distal extremities, no peripheral edema noted  Abdominal exam: soft non tender, no palpable masses, no scar  External genitalia: normal female genitalia, no lesions, " normal female hair distribution, no clitoral enlargement, nontender, no scars  The perineal reflexes are present  Exam Chaperoned by: Alisson Batista LPN  Vagina/cervix: atrophic but greatly improved from previous, pink and vascularized vagina, no discharge, exudate, lesion, or erythema  Bimanual exam: Anteverted, no palpable masses, non-tender exam  Urethra: no prolapse, caruncle, absent    POPQ (Date 23): Aa -0.5 Ba -0.5  C -8.5  GH 3 PB 2.5 TVL 10 Ap -3 Bp -3 D -8.5          ASSESSMENT:  Greta Luent is a 73 y.o.    1. Preoperative examination, unspecified            PLAN:    Nocturia/Urge urinary incontinence - We reviewed management options for Urge Incontinence including expectant management, lifestyle and behavioral modifications, Kegel's, PFPT, medications and more invasive treatments such as PTNS, Botox, and Interstim. We reviewed in great detail the success and failure rate associated with each approach. For Botox we discussed complication rates such as voiding dysfunction, need for self catheterization and, more rare complications of muscle weakness remote from Botox injection.  She has already tried Myrbetriq and Vesicare and neither worked for her. May start Trospium, depending on the results of the bladder diary.  Patient's main complaints are getting up at night with a full bladder.  She is on Amlodipine and Losartan which are both associated with increased urine production as well.  Her nocturia may be associated with being recumbent and the body having access to the fluid in that position.  Will review bladder diary and that should give more insight into what is happening overnight.    Bladder diary reviewed.  Patient is emptying the majority of her bladder at night.  On Day 2 she emptied almost 2 liters between 6pm and 4am.  She takes her diuretic at 7am.  Her not undergoing diuresis until at night may be a result of her lying recumbent and now the body has access to this fluid.  Will  recommend compression stockings during the day.  Also, she may be processing the medication in a delayed fashion.  Since she doesn't really urinate during the day, she may need to take the medication at night and this may prompt it working during the day.  This also may be due to a deficit of ADH, but would wait until other options exhausted before giving desmopressin.  Will need a CMP and GFR.  Last one was done in 2020.    Will coordinate with her PCP over the next week to possibly modify some of her medications.  For now, will modify when patient is taking her medication.  Currently she takes it at all at 7am, barely urinates during the day, and urinates all night 300-500cc each void.    Will move time to 9am on day 1, then 11am day 2, 1pm day 3, 3pm day 4, 5pm day 5, then 7 pm on day 6.    Counseled Urodynamic testing is done to assess urinary dysfunction.  Most common risks are UTI and some dysuria immediately post-procedure.  Otherwise, she will be awake and sitting in a chair being asked questions the entire time.  The whole procedure should take an hour of less.  Patient voiced understanding.    Stress urinary incontinence - We reviewed management options for Stress Incontinence including expectant management, lifestyle and behavioral modifications, Kegel's, PFPT, Poise incontinence inserts, pessaries, and more invasive treatments such as mid-urethral and fascial slings. We reviewed in great detail the success and failure rate associated with each approach.  For slings, we discussed complication rates like mesh erosion, post op urinary retention, voiding dysfunction, pain, infection, bleeding and the expected recovery after a sling procedure.  Would lean towards urethral bulking if she desires intervention.    Urodynamic testing scheduled for March 1st    Pelvic Organ Prolapse: Etiology of disease reviewed with the patient, emphasizing the benign nature of it.  She was counseled regarding management  strategies including: expectant management, pelvic floor physical therapy, weight loss, Kegel's, avoidance of constipation and heavy lifting, and pessary placement.  Surgical management with and without hysterectomy also reviewed.   Patient has: Stage 2  Patient chooses: conservative management. It is not bothersome at this time.  However, if her slow emptying is due to urethral kinking, will address this in the future.  Vaginal estrogen cream prescribed for vaginal health.     -Will place pessary for testing.    Vaginal atrophy - Encouraged use of vaginal estrogen as it will restore some elasticity to the vagina and has been shown to decrease the rate of recurrent UTIs, decrease vaginal pH, and increase vaginal lactobacillus.  A pea-sized amount to the vagina every night for 14 days, then 2-3 times per week.  Handout given on low dose vaginal estrogen.    -Continue vaginal estrogen    At the time the patient desires to proceed:  Compression stockings  Take diuretic before going to bed  UDS scheduled for March 1st  Continue Chadd Toledo MD  Pelvic Medicine and Reconstructive Surgery  Urogynecology  [facility]

## 2023-03-01 NOTE — PROGRESS NOTES
PELVIC MEDICINE AND RECONSTRUCTIVE SURGERY CONSENT      I,________________________, request and give permission to Dr. Amanda Toledo  to perform the following operation(s) or procedure(s):      Urodynamic Testing    My doctor has explained to me:    Why I am having this procedure: To evaluate voiding dysfunction.        These symptoms may include:   loss of urine while coughing, sneezing or exercising;           sudden or frequent need to pass urine,   getting up at night frequently to pass urine,   difficulty emptying your bladder, or   recurrent bladder infections    There is a chance that problems may happen and the common risks are: Pain, burning    with urination, urinary tract infection                              Initial_______  URODYNAMIC TESTING    Please read carefully    I had enough time to discuss my condition and treatment with my doctor. All of my questions have been answered. I have enough information to consent to the procedure.   At the option of my surgeon, I consent to the videotaping or photographing of any surgical procedure for diagnostic or treatment purposes, or for the purpose of professional education and/or physician professional certification in which my identity will be protected from disclosure to persons not otherwise involved in my care.    I understand that my doctor may ask some people to be in the room during my procedure. These people could be vendors or medical equipment representatives. They would provide information to help guide my treatment. They would also give advice on how to use the device.  An observer may be present in the room during the procedure. He/she would have to be pre-approved by my doctor. I consent to letting this person be in the room.   My doctor may give me a numbing medicine so I feel less pain. I consent to being given these medicines.    I agree that resident physician(s) or other assistant(s) present during my procedure will be able to, while under the  supervision of my primary surgeon(s) as noted above, assist with parts of the procedure(s).     Patient or Legal Representative Certification: I certify that I have read this consent form (or that it was read to me). I fully understand its contents. All blanks or statements were filled in before I signed.       Pateint or Patient's representative name and relationship (print)  Signature  Date  Time       Witness (print)   Signature                                                       Physician's Confirmation of Informed Consent: I certify that I have discussed with the patient (or legally responsible party) each of the subjects listed above.                                                                                                                Physician's  Name   (print)  Signature  Date  Time      Attestation:I have provided translation to the person who has signed above.         Name   (print)  Signature  Date  Time

## 2023-03-01 NOTE — PROGRESS NOTES
-PELVIC MEDICINE AND RECONSTRUCTIVE SURGERY FOLLOW UP NOTE    CHIEF COMPLAINT:  Follow up for nocturia and incontinence    HISTORY OF PRESENT ILLNESS:   Greta Chun is a 73 y.o. female  Patient reports first noticing symptoms at least 5 years ago.        Previously tried Myrbetriq and Vesicare.  Neither worked for her  She states she has a full bladder that she empties 3-4 times per night  She also has urinary hesitancy at times and has to wait to initiate stream, especially when the bladder is full  She states her urinary stream is slow and dribbles at times    Voids during the day: q1-2h  Voids at nighttime: 4-5 times (full bladder each time)  Leakage of urine with coughing or exercise: Yes - but not too often  -previous surgery? No  Leakage of urine with urgency: Yes - small leak (1 min to get to the bathroom)  Pad for leakage of urine:no   -how often do you change your pad? N/A  Sensation of incomplete emptying: YES/NO: no  Splinting to void/BM: no  History of kidney stones No  Hematuria No  > 3 UTIs in 12 months  No  - symptoms with UTI? N/A    Glasses/ounces of water in 1 day: 3-4 bottles  Cups of coffee/tea/soda in 1 day: 1 cup coffee and 1 cup tea  Bowel movements in 1 week: daily  Constipation/straining: yes - uses Miralax    Interim Hx: Patient presents for discussion of upcoming procedure.    Bladder diary:  Day 1: In 175  Out: 1850   Day 2: In 1650  Out: 2560 (5 out of  9 voids took place between 7:15pm and 6:30am, each 100-500cc)  Day 3: In     Out: 2125 (8 out of 10 voids took place between 7:35pm and 6:30 am; each 100-400cc)    Gyn Hx:  Patient reports h/o Normal paps; denies h/o Fibroids, denies h/o Endometriosis, denies h/o Ovarian Cysts, denies h/o abnormal paps, denies h/o LEEP/Cryo  Menopause   No LMP recorded. Patient is postmenopausal.   V3  Obstetric complications? no  Vaginal births? yes  -use of forceps or vacuum? no    OB History    Para Term  AB Living   3 3 0 0 0 3    SAB IAB Ectopic Multiple Live Births   0 0 0 0 3       Review of patient's allergies indicates:   Allergen Reactions    Celebrex [celecoxib] Swelling     Facial swelling          Current Outpatient Medications:     amLODIPine (NORVASC) 5 MG tablet, amlodipine 5 mg tablet  TAKE 1 TABLET BY MOUTH EVERY DAY, Disp: , Rfl:     azelastine (OPTIVAR) 0.05 % ophthalmic solution, azelastine 0.05 % eye drops  INSTILL 1 DROP INTO BOTH EYES TWICE DAILY, Disp: , Rfl:     calcium carbonate (OS-RANJAN) 600 mg calcium (1,500 mg) Tab, Take 600 mg by mouth once., Disp: , Rfl:     estradioL (ESTRACE) 0.01 % (0.1 mg/gram) vaginal cream, Use a pea sized amount to the vagina once a night for 14 nights when initiating the medication, then 2-3 times per week.  DO NOT use the applicator., Disp: 42.5 g, Rfl: 10    fexofenadine (ALLEGRA) 180 MG tablet, Take 180 mg by mouth once daily., Disp: , Rfl:     losartan (COZAAR) 100 MG tablet, losartan 100 mg tablet  TAKE 1 TABLET BY MOUTH EVERY DAY, Disp: , Rfl:     melatonin 10 mg Cap, Take by mouth every evening., Disp: , Rfl:     multivit with min-folic acid 0.4 mg Tab, Take 1 tablet by mouth Daily., Disp: , Rfl:     omeprazole (PRILOSEC) 10 MG capsule, Take 10 mg by mouth once daily., Disp: , Rfl:     polyethylene glycol (GLYCOLAX) 17 gram/dose powder, Take 17 g by mouth once daily., Disp: , Rfl:     propranoloL 120 mg Cp24, Take 2 tablets by mouth Daily., Disp: , Rfl:     Past Medical History:   Diagnosis Date    Arthritis     Borderline diabetes     Chronic low back pain     Hypertension     Overactive bladder     Tremors of nervous system     x 25 yrs    Watery eyes        Past Surgical History:   Procedure Laterality Date    BACK SURGERY  2012    CARPAL TUNNEL RELEASE Right 2019    cholecysectomy  2003    epidural steroid injections      SURGICAL REMOVAL OF BONE SPUR  2014    TOE SURGERY Left     TRANSFORAMINAL EPIDURAL INJECTION OF STEROID Right 08/01/2022    Procedure: INJECTION, STEROID,  EPIDURAL, TRANSFORAMINAL APPROACH Right L4 & L5 TFESI;  Surgeon: Lisa Concepcion MD;  Location: [facility] OR;  Service: Pain Management;  Laterality: Right;  Right L4 & L5 TFESI    TRANSFORAMINAL EPIDURAL INJECTION OF STEROID Right 10/17/2022    Procedure: Injection,steroid,epidural,transforaminal approach Right L4 and L5;  Surgeon: Lisa Concepcion MD;  Location: [facility] OR;  Service: Pain Management;  Laterality: Right;  L4 and L5    TUBAL LIGATION  1980       Family History   Problem Relation Age of Onset    Hypertension Mother     Stroke Mother     Dementia Mother     Hypertension Father     Dementia Father         Social History     Tobacco Use    Smoking status: Never    Smokeless tobacco: Never   Substance Use Topics    Alcohol use: Yes     Alcohol/week: 1.0 standard drink     Types: 1 Glasses of wine per week     Comment: occassionally    Drug use: Never        Review of Systems   Psychological ROS: negative  Eyes: Trouble seeing near or far and floaters  ENT ROS: Negative  Neuro ROS: Tremors  Respiratory ROS: Negative  Gastrointestinal ROS: Frequent need for antacids  Cardiovascular ROS: Negative  Genitourinary ROS: Frequent urination, Difficulty emptying bladder, and Waking to urinate  Integumentary ROS: Negative  Musculoskeletal ROS: Muscle cramps/pains in the hands      Physical Exam:   There were no vitals taken for this visit.  General: No acute distress   Skin: no lesions  CV: RRR  Chest: clear  Musculoskeletal: normal lower extremity strength  Sensation to light touch in the lower extremities is normal   Extremities: well perfused with normal pulses in the distal extremities, no peripheral edema noted  Abdominal exam: soft non tender, no palpable masses, no scar  External genitalia: normal female genitalia, no lesions, normal female hair distribution, no clitoral enlargement, nontender, no scars  The perineal reflexes are present  Exam Chaperoned by: Alisson Batista LPN  Vagina/cervix: atrophic but greatly  improved from previous, pink and vascularized vagina, no discharge, exudate, lesion, or erythema  Bimanual exam: Anteverted, no palpable masses, non-tender exam  Urethra: no prolapse, caruncle, absent    POPQ (Date 23): Aa -0.5 Ba -0.5  C -8.5  GH 3 PB 2.5 TVL 10 Ap -3 Bp -3 D -8.5          ASSESSMENT:  Greta Luent is a 73 y.o.    1. Nocturia    2. Urge incontinence    3. Stress incontinence    4. Prolapse of anterior vaginal wall    5. Vaginal atrophy            PLAN:    Nocturia/Urge urinary incontinence - We reviewed management options for Urge Incontinence including expectant management, lifestyle and behavioral modifications, Kegel's, PFPT, medications and more invasive treatments such as PTNS, Botox, and Interstim. We reviewed in great detail the success and failure rate associated with each approach. For Botox we discussed complication rates such as voiding dysfunction, need for self catheterization and, more rare complications of muscle weakness remote from Botox injection.  She has already tried Myrbetriq and Vesicare and neither worked for her. May start Trospium, depending on the results of the bladder diary.  Patient's main complaints are getting up at night with a full bladder.  She is on Amlodipine and Losartan which are both associated with increased urine production as well.  Her nocturia may be associated with being recumbent and the body having access to the fluid in that position.  Will review bladder diary and that should give more insight into what is happening overnight.    Bladder diary reviewed.  Patient is emptying the majority of her bladder at night.  On Day 2 she emptied almost 2 liters between 6pm and 4am.  She takes her diuretic at 7am.  Her not undergoing diuresis until at night may be a result of her lying recumbent and now the body has access to this fluid.  Will recommend compression stockings during the day.  Also, she may be processing the medication in a delayed  fashion.  Since she doesn't really urinate during the day, she may need to take the medication at night and this may prompt it working during the day.  This also may be due to a deficit of ADH, but would wait until other options exhausted before giving desmopressin.  Will need a CMP and GFR.  Last one was done in 2020.    Will coordinate with her PCP over the next week to possibly modify some of her medications.  For now, will modify when patient is taking her medication.  Currently she takes it at all at 7am, barely urinates during the day, and urinates all night 300-500cc each void.    Will move time to 9am on day 1, then 11am day 2, 1pm day 3, 3pm day 4, 5pm day 5, then 7 pm on day 6.    Counseled Urodynamic testing is done to assess urinary dysfunction.  Most common risks are UTI and some dysuria immediately post-procedure.  Otherwise, she will be awake and sitting in a chair being asked questions the entire time.  The whole procedure should take an hour of less.  Patient voiced understanding.    Stress urinary incontinence - We reviewed management options for Stress Incontinence including expectant management, lifestyle and behavioral modifications, Kegel's, PFPT, Poise incontinence inserts, pessaries, and more invasive treatments such as mid-urethral and fascial slings. We reviewed in great detail the success and failure rate associated with each approach.  For slings, we discussed complication rates like mesh erosion, post op urinary retention, voiding dysfunction, pain, infection, bleeding and the expected recovery after a sling procedure.  Would lean towards urethral bulking if she desires intervention.    Urodynamic testing scheduled for March 1st    Pelvic Organ Prolapse: Etiology of disease reviewed with the patient, emphasizing the benign nature of it.  She was counseled regarding management strategies including: expectant management, pelvic floor physical therapy, weight loss, Kegel's, avoidance of  constipation and heavy lifting, and pessary placement.  Surgical management with and without hysterectomy also reviewed.   Patient has: Stage 2  Patient chooses: conservative management. It is not bothersome at this time.  However, if her slow emptying is due to urethral kinking, will address this in the future.  Vaginal estrogen cream prescribed for vaginal health.     -Will place pessary for testing.    Vaginal atrophy - Encouraged use of vaginal estrogen as it will restore some elasticity to the vagina and has been shown to decrease the rate of recurrent UTIs, decrease vaginal pH, and increase vaginal lactobacillus.  A pea-sized amount to the vagina every night for 14 days, then 2-3 times per week.  Handout given on low dose vaginal estrogen.    -Continue vaginal estrogen    At the time the patient desires to proceed:  Compression stockings  Take diuretic before going to bed  UDS scheduled for March 1st  Continue Chadd Toledo MD  Pelvic Medicine and Reconstructive Surgery  Urogynecology  [facility]

## 2023-03-02 NOTE — DISCHARGE SUMMARY
[facility] Surgical - Periop Services  Urogynecology/[facility]  Discharge Summary      Patient Name: Greta Chun  MRN: [id number]  Admission Date: 3/1/2023  Hospital Length of Stay: 0 days  Discharge Date and Time: 3/1/2023 12:38 PM  Attending Physician: Amanda Toledo MD  Discharging Provider: Amanda Toledo MD  Primary Care Physician: Raymond Alvarado Jr, MD    HPI: Patient with Nocturia and frequency refractory to previous medications    Procedure(s) (LRB):  URODYNAMIC STUDY, FLUOROSCOPIC (N/A)     Course (synopsis of major diagnoses, care, treatment, and services provided during the course of the hospital stay): Urodynamic study    Discharged Condition: good    Disposition: Home or Self Care    Follow Up: In office for results    Patient Instructions:   Drink water and make sure to urinate within 4-6 hours of return home    Medications:  Reconciled Home Medications:      Medication List        ASK your doctor about these medications      amLODIPine 5 MG tablet  Commonly known as: NORVASC  amlodipine 5 mg tablet   TAKE 1 TABLET BY MOUTH EVERY DAY     azelastine 0.05 % ophthalmic solution  Commonly known as: OPTIVAR  azelastine 0.05 % eye drops   INSTILL 1 DROP INTO BOTH EYES TWICE DAILY     calcium carbonate 600 mg calcium (1,500 mg) Tab  Commonly known as: OS-RANJAN  Take 600 mg by mouth once.     estradioL 0.01 % (0.1 mg/gram) vaginal cream  Commonly known as: ESTRACE  Use a pea sized amount to the vagina once a night for 14 nights when initiating the medication, then 2-3 times per week.  DO NOT use the applicator.     fexofenadine 180 MG tablet  Commonly known as: ALLEGRA  Take 180 mg by mouth once daily.     losartan 100 MG tablet  Commonly known as: COZAAR  losartan 100 mg tablet   TAKE 1 TABLET BY MOUTH EVERY DAY     melatonin 10 mg Cap  Take by mouth every evening.     multivit with min-folic acid 0.4 mg Tab  Take 1 tablet by mouth Daily.     omeprazole 10 MG capsule  Commonly known as: PRILOSEC  Take 10 mg  by mouth once daily.     polyethylene glycol 17 gram/dose powder  Commonly known as: GLYCOLAX  Take 17 g by mouth once daily.     propranoloL 120 mg Cp24  Take 2 tablets by mouth Daily.              Time spent on the discharge of patient: 20 minutes    Amanda Toledo MD  Urogynecology/[facility] Surgical - Periop Services

## 2023-03-03 ENCOUNTER — OFFICE VISIT (OUTPATIENT)
Dept: UROGYNECOLOGY | Facility: CLINIC | Age: 73
End: 2023-03-03
Payer: MEDICARE

## 2023-03-03 VITALS
RESPIRATION RATE: 20 BRPM | HEART RATE: 65 BPM | DIASTOLIC BLOOD PRESSURE: 85 MMHG | TEMPERATURE: 98 F | SYSTOLIC BLOOD PRESSURE: 130 MMHG | WEIGHT: 148 LBS | BODY MASS INDEX: 24.66 KG/M2 | HEIGHT: 65 IN

## 2023-03-03 DIAGNOSIS — N31.2 HYPOTONICITY OF BLADDER: ICD-10-CM

## 2023-03-03 DIAGNOSIS — R35.1 NOCTURIA: Primary | ICD-10-CM

## 2023-03-03 PROCEDURE — 99214 PR OFFICE/OUTPT VISIT, EST, LEVL IV, 30-39 MIN: ICD-10-PCS | Mod: 25,S$GLB,, | Performed by: OBSTETRICS & GYNECOLOGY

## 2023-03-03 PROCEDURE — 51701 PR INSERTION OF NON-INDWELLING BLADDER CATHETERIZATION FOR RESIDUAL UR: ICD-10-PCS | Mod: S$GLB,,, | Performed by: OBSTETRICS & GYNECOLOGY

## 2023-03-03 PROCEDURE — 99214 OFFICE O/P EST MOD 30 MIN: CPT | Mod: 25,S$GLB,, | Performed by: OBSTETRICS & GYNECOLOGY

## 2023-03-03 PROCEDURE — 51701 INSERT BLADDER CATHETER: CPT | Mod: S$GLB,,, | Performed by: OBSTETRICS & GYNECOLOGY

## 2023-03-03 NOTE — PROGRESS NOTES
-PELVIC MEDICINE AND RECONSTRUCTIVE SURGERY FOLLOW UP NOTE    CHIEF COMPLAINT:  Follow up for nocturia and incontinence    HISTORY OF PRESENT ILLNESS:   Greta Chun is a 73 y.o. female  Patient reports first noticing symptoms at least 5 years ago.        Previously tried Myrbetriq and Vesicare.  Neither worked for her  She states she has a full bladder that she empties 3-4 times per night  She also has urinary hesitancy at times and has to wait to initiate stream, especially when the bladder is full  She states her urinary stream is slow and dribbles at times    Interim Hx: Patient presents for teaching for self catheterization.    Bladder diary:  Day 1: In 1750  Out: 1850   Day 2: In 1650  Out: 2560 (5 out of  9 voids took place between 7:15pm and 6:30am, each 100-500cc)  Day 3: In   750  Out: 2125 (8 out of 10 voids took place between 7:35pm and 6:30 am; each 100-400cc)    Gyn Hx:  Patient reports h/o Normal paps; denies h/o Fibroids, denies h/o Endometriosis, denies h/o Ovarian Cysts, denies h/o abnormal paps, denies h/o LEEP/Cryo  Menopause   No LMP recorded. Patient is postmenopausal.   V3  Obstetric complications? no  Vaginal births? yes  -use of forceps or vacuum? no    OB History    Para Term  AB Living   3 3 0 0 0 3   SAB IAB Ectopic Multiple Live Births   0 0 0 0 3       Review of patient's allergies indicates:   Allergen Reactions    Celebrex [celecoxib] Swelling     Facial swelling          Current Outpatient Medications:     amLODIPine (NORVASC) 5 MG tablet, amlodipine 5 mg tablet  TAKE 1 TABLET BY MOUTH EVERY DAY, Disp: , Rfl:     azelastine (OPTIVAR) 0.05 % ophthalmic solution, azelastine 0.05 % eye drops  INSTILL 1 DROP INTO BOTH EYES TWICE DAILY, Disp: , Rfl:     calcium carbonate (OS-RANJAN) 600 mg calcium (1,500 mg) Tab, Take 600 mg by mouth once., Disp: , Rfl:     estradioL (ESTRACE) 0.01 % (0.1 mg/gram) vaginal cream, Use a pea sized amount to the vagina once a night for 14 nights  when initiating the medication, then 2-3 times per week.  DO NOT use the applicator., Disp: 42.5 g, Rfl: 10    fexofenadine (ALLEGRA) 180 MG tablet, Take 180 mg by mouth once daily., Disp: , Rfl:     losartan (COZAAR) 100 MG tablet, losartan 100 mg tablet  TAKE 1 TABLET BY MOUTH EVERY DAY, Disp: , Rfl:     melatonin 10 mg Cap, Take by mouth every evening., Disp: , Rfl:     multivit with min-folic acid 0.4 mg Tab, Take 1 tablet by mouth Daily., Disp: , Rfl:     omeprazole (PRILOSEC) 10 MG capsule, Take 10 mg by mouth once daily., Disp: , Rfl:     polyethylene glycol (GLYCOLAX) 17 gram/dose powder, Take 17 g by mouth once daily., Disp: , Rfl:     propranoloL 120 mg Cp24, Take 2 tablets by mouth Daily., Disp: , Rfl:     Past Medical History:   Diagnosis Date    Arthritis     Borderline diabetes     Chronic low back pain     Hypertension     Overactive bladder     Tremors of nervous system     x 25 yrs    Watery eyes        Past Surgical History:   Procedure Laterality Date    BACK SURGERY  2012    CARPAL TUNNEL RELEASE Right 2019    cholecysectomy  2003    epidural steroid injections      FLUOROSCOPIC URODYNAMIC STUDY N/A 3/1/2023    Procedure: URODYNAMIC STUDY, FLUOROSCOPIC;  Surgeon: Amanda Toledo MD;  Location: [facility];  Service: Urology;  Laterality: N/A;    KS CYSTOMETROGRAM, COMPLEX  3/1/2023    SURGICAL REMOVAL OF BONE SPUR  2014    TOE SURGERY Left     TRANSFORAMINAL EPIDURAL INJECTION OF STEROID Right 08/01/2022    Procedure: INJECTION, STEROID, EPIDURAL, TRANSFORAMINAL APPROACH Right L4 & L5 TFESI;  Surgeon: Lisa Concepcion MD;  Location: [facility] OR;  Service: Pain Management;  Laterality: Right;  Right L4 & L5 TFESI    TRANSFORAMINAL EPIDURAL INJECTION OF STEROID Right 10/17/2022    Procedure: Injection,steroid,epidural,transforaminal approach Right L4 and L5;  Surgeon: Lisa Concepcion MD;  Location: [facility] OR;  Service: Pain Management;  Laterality: Right;  L4 and L5    TUBAL LIGATION  1980       Family  "History   Problem Relation Age of Onset    Hypertension Mother     Stroke Mother     Dementia Mother     Hypertension Father     Dementia Father         Social History     Tobacco Use    Smoking status: Never    Smokeless tobacco: Never   Substance Use Topics    Alcohol use: Yes     Alcohol/week: 1.0 standard drink     Types: 1 Glasses of wine per week     Comment: occassionally    Drug use: Never        Review of Systems   Psychological ROS: negative  Eyes: Trouble seeing near or far and floaters  ENT ROS: Negative  Neuro ROS: Tremors  Respiratory ROS: Negative  Gastrointestinal ROS: Frequent need for antacids  Cardiovascular ROS: Negative  Genitourinary ROS: Frequent urination, Difficulty emptying bladder, and Waking to urinate  Integumentary ROS: Negative  Musculoskeletal ROS: Muscle cramps/pains in the hands      Physical Exam:   /85 (BP Location: Left arm, Patient Position: Sitting, BP Method: Medium (Automatic))   Pulse 65   Temp 97.8 °F (36.6 °C) (Oral)   Resp 20   Ht 5' 5" (1.651 m)   Wt 67.1 kg (148 lb)   BMI 24.63 kg/m²   General: No acute distress   Skin: no lesions    Patient instructed on self cath with a mirror and urethra identified.  Patient was able to insert 14Fr straight catheter on her own while being observed 3 times with urine liberated each time.    POPQ (Date 23): Aa -0.5 Ba -0.5  C -8.5  GH 3 PB 2.5 TVL 10 Ap -3 Bp -3 D -8.5          ASSESSMENT:  Greta Luent is a 73 y.o.    1. Nocturia    2. Hypotonicity of bladder        PLAN:    Nocturia/Detrusor underactivity - patient to CIC every night for 7 nights and record residuals. Patient may be a candidate for sacroneuromodulation.  Discussed this as an option.  She will discuss this further with her daughter who is an OB/GYN.  Patient counseled and given information about the procedure.    Overflow incontinence - UDT revealed what was thought to be DANIEL was really overflow.    Pelvic Organ Prolapse: Etiology of disease " reviewed with the patient, emphasizing the benign nature of it.  She was counseled regarding management strategies including: expectant management, pelvic floor physical therapy, weight loss, Kegel's, avoidance of constipation and heavy lifting, and pessary placement.  Surgical management with and without hysterectomy also reviewed.   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  Prolapse is minimal    Vaginal atrophy - Encouraged use of vaginal estrogen as it will restore some elasticity to the vagina and has been shown to decrease the rate of recurrent UTIs, decrease vaginal pH, and increase vaginal lactobacillus.  A pea-sized amount to the vagina every night for 14 days, then 2-3 times per week.  Handout given on low dose vaginal estrogen.    -Continue vaginal estrogen    At the time the patient desires to proceed:  Compression stockings  Take diuretic before going to bed  Continue Estrace  CIC for 7 days before bed to assess residuals    RTC for assessment of residuals    ALEJANDRA Toledo MD  Pelvic Medicine and Reconstructive Surgery  Urogynecology  [facility]

## 2023-03-05 VITALS
SYSTOLIC BLOOD PRESSURE: 165 MMHG | BODY MASS INDEX: 24.75 KG/M2 | DIASTOLIC BLOOD PRESSURE: 88 MMHG | HEIGHT: 65 IN | HEART RATE: 63 BPM | OXYGEN SATURATION: 99 % | RESPIRATION RATE: 18 BRPM | TEMPERATURE: 98 F | WEIGHT: 148.56 LBS

## 2023-03-16 ENCOUNTER — OFFICE VISIT (OUTPATIENT)
Dept: UROGYNECOLOGY | Facility: CLINIC | Age: 73
End: 2023-03-16
Payer: MEDICARE

## 2023-03-16 VITALS
HEART RATE: 63 BPM | BODY MASS INDEX: 24.91 KG/M2 | WEIGHT: 149.5 LBS | RESPIRATION RATE: 18 BRPM | HEIGHT: 65 IN | DIASTOLIC BLOOD PRESSURE: 87 MMHG | SYSTOLIC BLOOD PRESSURE: 157 MMHG

## 2023-03-16 DIAGNOSIS — N81.9 FEMALE GENITAL PROLAPSE, UNSPECIFIED TYPE: ICD-10-CM

## 2023-03-16 DIAGNOSIS — N31.2 HYPOTONICITY OF BLADDER: ICD-10-CM

## 2023-03-16 DIAGNOSIS — R35.1 NOCTURIA: Primary | ICD-10-CM

## 2023-03-16 DIAGNOSIS — N95.2 VAGINAL ATROPHY: ICD-10-CM

## 2023-03-16 PROCEDURE — 99214 PR OFFICE/OUTPT VISIT, EST, LEVL IV, 30-39 MIN: ICD-10-PCS | Mod: S$GLB,,, | Performed by: OBSTETRICS & GYNECOLOGY

## 2023-03-16 PROCEDURE — 99214 OFFICE O/P EST MOD 30 MIN: CPT | Mod: S$GLB,,, | Performed by: OBSTETRICS & GYNECOLOGY

## 2023-03-27 NOTE — PROGRESS NOTES
-PELVIC MEDICINE AND RECONSTRUCTIVE SURGERY FOLLOW UP NOTE    CHIEF COMPLAINT:  Follow up for nocturia and incontinence    HISTORY OF PRESENT ILLNESS:   Greta Chun is a 73 y.o. female  Patient reports first noticing symptoms at least 5 years ago.        Previously tried Myrbetriq and Vesicare.  Neither worked for her  She states she has a full bladder that she empties 3-4 times per night  She also has urinary hesitancy at times and has to wait to initiate stream, especially when the bladder is full  She states her urinary stream is slow and dribbles at times    Interim Hx:   Patient presents today for review of her bladder residuals over a 7 day period.    The largest amount was 100 mL.    She has also moved her medication administration as previously laid out and wearing compression stockings  She states she is actually doing much better and that she is urinating during the daytime now.  She urinates at most once to twice overnight, and this is a great improvement over previous.    Gyn Hx:  Patient reports h/o Normal paps; denies h/o Fibroids, denies h/o Endometriosis, denies h/o Ovarian Cysts, denies h/o abnormal paps, denies h/o LEEP/Cryo  Menopause   No LMP recorded. Patient is postmenopausal.   V3  Obstetric complications? no  Vaginal births? yes  -use of forceps or vacuum? no    OB History    Para Term  AB Living   3 3 0 0 0 3   SAB IAB Ectopic Multiple Live Births   0 0 0 0 3       Review of patient's allergies indicates:   Allergen Reactions    Celebrex [celecoxib] Swelling     Facial swelling          Current Outpatient Medications:     amLODIPine (NORVASC) 5 MG tablet, amlodipine 5 mg tablet  TAKE 1 TABLET BY MOUTH EVERY DAY, Disp: , Rfl:     azelastine (OPTIVAR) 0.05 % ophthalmic solution, azelastine 0.05 % eye drops  INSTILL 1 DROP INTO BOTH EYES TWICE DAILY, Disp: , Rfl:     calcium carbonate (OS-RANJAN) 600 mg calcium (1,500 mg) Tab, Take 600 mg by mouth once., Disp: , Rfl:      estradioL (ESTRACE) 0.01 % (0.1 mg/gram) vaginal cream, Use a pea sized amount to the vagina once a night for 14 nights when initiating the medication, then 2-3 times per week.  DO NOT use the applicator., Disp: 42.5 g, Rfl: 10    fexofenadine (ALLEGRA) 180 MG tablet, Take 180 mg by mouth once daily., Disp: , Rfl:     losartan (COZAAR) 100 MG tablet, losartan 100 mg tablet  TAKE 1 TABLET BY MOUTH EVERY DAY, Disp: , Rfl:     melatonin 10 mg Cap, Take by mouth every evening., Disp: , Rfl:     multivit with min-folic acid 0.4 mg Tab, Take 1 tablet by mouth Daily., Disp: , Rfl:     omeprazole (PRILOSEC) 10 MG capsule, Take 10 mg by mouth once daily., Disp: , Rfl:     polyethylene glycol (GLYCOLAX) 17 gram/dose powder, Take 17 g by mouth once daily., Disp: , Rfl:     propranoloL 120 mg Cp24, Take 2 tablets by mouth Daily., Disp: , Rfl:     Past Medical History:   Diagnosis Date    Arthritis     Borderline diabetes     Chronic low back pain     Hypertension     Overactive bladder     Tremors of nervous system     x 25 yrs    Watery eyes        Past Surgical History:   Procedure Laterality Date    BACK SURGERY  2012    CARPAL TUNNEL RELEASE Right 2019    cholecysectomy  2003    epidural steroid injections      FLUOROSCOPIC URODYNAMIC STUDY N/A 3/1/2023    Procedure: URODYNAMIC STUDY, FLUOROSCOPIC;  Surgeon: Amanda Toledo MD;  Location: [facility];  Service: Urology;  Laterality: N/A;    IA CYSTOMETROGRAM, COMPLEX  3/1/2023    SURGICAL REMOVAL OF BONE SPUR  2014    TOE SURGERY Left     TRANSFORAMINAL EPIDURAL INJECTION OF STEROID Right 08/01/2022    Procedure: INJECTION, STEROID, EPIDURAL, TRANSFORAMINAL APPROACH Right L4 & L5 TFESI;  Surgeon: Lisa Concepcion MD;  Location: [facility] OR;  Service: Pain Management;  Laterality: Right;  Right L4 & L5 TFESI    TRANSFORAMINAL EPIDURAL INJECTION OF STEROID Right 10/17/2022    Procedure: Injection,steroid,epidural,transforaminal approach Right L4 and L5;  Surgeon: Lisa Concepcion MD;  " Location: [facility] OR;  Service: Pain Management;  Laterality: Right;  L4 and L5    TUBAL LIGATION         Family History   Problem Relation Age of Onset    Hypertension Mother     Stroke Mother     Dementia Mother     Hypertension Father     Dementia Father         Social History     Tobacco Use    Smoking status: Never    Smokeless tobacco: Never   Substance Use Topics    Alcohol use: Yes     Alcohol/week: 1.0 standard drink     Types: 1 Glasses of wine per week     Comment: occassionally    Drug use: Never        Review of Systems   Psychological ROS: negative  Eyes: Trouble seeing near or far and floaters  ENT ROS: Negative  Neuro ROS: Tremors  Respiratory ROS: Negative  Gastrointestinal ROS: Frequent need for antacids  Cardiovascular ROS: Negative  Genitourinary ROS: Frequent urination, Difficulty emptying bladder, and Waking to urinate  Integumentary ROS: Negative  Musculoskeletal ROS: Muscle cramps/pains in the hands      Physical Exam:   BP (!) 157/87 (BP Location: Left arm, Patient Position: Sitting, BP Method: Medium (Manual))   Pulse 63   Resp 18   Ht 5' 5" (1.651 m)   Wt 67.8 kg (149 lb 7.6 oz)   BMI 24.87 kg/m²   General: No acute distress   Skin: no lesions    POPQ (Date 23): Aa -0.5 Ba -0.5  C -8.5  GH 3 PB 2.5 TVL 10 Ap -3 Bp -3 D -8.5          ASSESSMENT:  Greta Luent is a 73 y.o.    1. Nocturia    2. Hypotonicity of bladder    3. Female genital prolapse, unspecified type    4. Vaginal atrophy          PLAN:    Nocturia/Detrusor underactivity - Patient doing much better with administration of medication in the evening.  She is urinating during the daytime now and is able to empty her bladder.  Patient to continue taking her medicine in the evening.  As long as she is able to empty her bladder, no need for further intervention.  Residuals were all less than or equal to 100mL.  She also knows how to self cath now in case she cannot empty in the future.    Pelvic Organ Prolapse: " Etiology of disease reviewed with the patient, emphasizing the benign nature of it.  She was counseled regarding management strategies including: expectant management, pelvic floor physical therapy, weight loss, Kegel's, avoidance of constipation and heavy lifting, and pessary placement.  Surgical management with and without hysterectomy also reviewed.   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  Prolapse is minimal    Vaginal atrophy - Encouraged use of vaginal estrogen as it will restore some elasticity to the vagina and has been shown to decrease the rate of recurrent UTIs, decrease vaginal pH, and increase vaginal lactobacillus.  A pea-sized amount to the vagina every night for 14 days, then 2-3 times per week.  Handout given on low dose vaginal estrogen.    -Continue vaginal estrogen    At the time the patient desires to proceed:  Compression stockings  Take diuretic before going to bed  Continue Estrace    RTC for 3-6 months or sooner if needed    ALEJANDRA Toledo MD  Pelvic Medicine and Reconstructive Surgery  Urogynecology  [facility] [place]

## 2023-10-25 ENCOUNTER — HOSPITAL ENCOUNTER (OUTPATIENT)
Dept: RADIOLOGY | Facility: HOSPITAL | Age: 73
Discharge: HOME | End: 2023-10-25
Attending: OTOLARYNGOLOGY
Payer: MEDICARE

## 2023-10-25 DIAGNOSIS — Z01.818 OTHER SPECIFIED PRE-OPERATIVE EXAMINATION: Primary | ICD-10-CM

## 2023-10-25 DIAGNOSIS — Z79.01 LONG TERM (CURRENT) USE OF ANTICOAGULANTS: ICD-10-CM

## 2023-10-25 DIAGNOSIS — Z01.818 OTHER SPECIFIED PRE-OPERATIVE EXAMINATION: ICD-10-CM

## 2023-10-25 PROCEDURE — 71046 X-RAY EXAM CHEST 2 VIEWS: CPT | Mod: TC

## 2023-11-01 ENCOUNTER — ANESTHESIA EVENT (OUTPATIENT)
Dept: SURGERY | Facility: HOSPITAL | Age: 73
End: 2023-11-01
Payer: MEDICARE

## 2023-11-02 ENCOUNTER — ANESTHESIA (OUTPATIENT)
Dept: SURGERY | Facility: HOSPITAL | Age: 73
End: 2023-11-02
Payer: MEDICARE

## 2023-11-02 ENCOUNTER — HOSPITAL ENCOUNTER (OUTPATIENT)
Facility: HOSPITAL | Age: 73
Discharge: HOME | End: 2023-11-02
Attending: OTOLARYNGOLOGY | Admitting: OTOLARYNGOLOGY
Payer: MEDICARE

## 2023-11-02 DIAGNOSIS — J32.8 OTHER CHRONIC SINUSITIS: ICD-10-CM

## 2023-11-02 PROCEDURE — 88304 TISSUE EXAM BY PATHOLOGIST: CPT | Performed by: OTOLARYNGOLOGY

## 2023-11-02 PROCEDURE — 25000003 PHARM REV CODE 250: Performed by: OTOLARYNGOLOGY

## 2023-11-02 PROCEDURE — 87102 FUNGUS ISOLATION CULTURE: CPT | Performed by: OTOLARYNGOLOGY

## 2023-11-02 PROCEDURE — 87070 CULTURE OTHR SPECIMN AEROBIC: CPT | Performed by: OTOLARYNGOLOGY

## 2023-11-02 PROCEDURE — 71000033 HC RECOVERY, INTIAL HOUR: Performed by: OTOLARYNGOLOGY

## 2023-11-02 PROCEDURE — 63600175 PHARM REV CODE 636 W HCPCS: Performed by: ANESTHESIOLOGY

## 2023-11-02 PROCEDURE — 63600175 PHARM REV CODE 636 W HCPCS: Performed by: NURSE ANESTHETIST, CERTIFIED REGISTERED

## 2023-11-02 PROCEDURE — D9220A PRA ANESTHESIA: Mod: CRNA,,, | Performed by: NURSE ANESTHETIST, CERTIFIED REGISTERED

## 2023-11-02 PROCEDURE — 87116 MYCOBACTERIA CULTURE: CPT | Performed by: OTOLARYNGOLOGY

## 2023-11-02 PROCEDURE — 36000711: Performed by: OTOLARYNGOLOGY

## 2023-11-02 PROCEDURE — 25000003 PHARM REV CODE 250: Performed by: NURSE ANESTHETIST, CERTIFIED REGISTERED

## 2023-11-02 PROCEDURE — 87206 SMEAR FLUORESCENT/ACID STAI: CPT | Performed by: OTOLARYNGOLOGY

## 2023-11-02 PROCEDURE — 36000710: Performed by: OTOLARYNGOLOGY

## 2023-11-02 PROCEDURE — 88311 DECALCIFY TISSUE: CPT

## 2023-11-02 PROCEDURE — C9046 COCAINE HCL NASAL SOLUTION: HCPCS | Performed by: OTOLARYNGOLOGY

## 2023-11-02 PROCEDURE — 27201423 OPTIME MED/SURG SUP & DEVICES STERILE SUPPLY: Performed by: OTOLARYNGOLOGY

## 2023-11-02 PROCEDURE — 87075 CULTR BACTERIA EXCEPT BLOOD: CPT | Performed by: OTOLARYNGOLOGY

## 2023-11-02 PROCEDURE — 37000008 HC ANESTHESIA 1ST 15 MINUTES: Performed by: OTOLARYNGOLOGY

## 2023-11-02 PROCEDURE — 87205 SMEAR GRAM STAIN: CPT | Performed by: OTOLARYNGOLOGY

## 2023-11-02 PROCEDURE — 37000009 HC ANESTHESIA EA ADD 15 MINS: Performed by: OTOLARYNGOLOGY

## 2023-11-02 PROCEDURE — D9220A PRA ANESTHESIA: ICD-10-PCS | Mod: ANES,,, | Performed by: ANESTHESIOLOGY

## 2023-11-02 PROCEDURE — 63600175 PHARM REV CODE 636 W HCPCS: Performed by: OTOLARYNGOLOGY

## 2023-11-02 PROCEDURE — 88312 SPECIAL STAINS GROUP 1: CPT

## 2023-11-02 PROCEDURE — D9220A PRA ANESTHESIA: Mod: ANES,,, | Performed by: ANESTHESIOLOGY

## 2023-11-02 PROCEDURE — 71000039 HC RECOVERY, EACH ADD'L HOUR: Performed by: OTOLARYNGOLOGY

## 2023-11-02 PROCEDURE — D9220A PRA ANESTHESIA: ICD-10-PCS | Mod: CRNA,,, | Performed by: NURSE ANESTHETIST, CERTIFIED REGISTERED

## 2023-11-02 PROCEDURE — 71000015 HC POSTOP RECOV 1ST HR: Performed by: OTOLARYNGOLOGY

## 2023-11-02 PROCEDURE — 71000016 HC POSTOP RECOV ADDL HR: Performed by: OTOLARYNGOLOGY

## 2023-11-02 RX ORDER — SODIUM CHLORIDE, SODIUM LACTATE, POTASSIUM CHLORIDE, CALCIUM CHLORIDE 600; 310; 30; 20 MG/100ML; MG/100ML; MG/100ML; MG/100ML
INJECTION, SOLUTION INTRAVENOUS CONTINUOUS
Status: DISCONTINUED | OUTPATIENT
Start: 2023-11-02 | End: 2023-11-02

## 2023-11-02 RX ORDER — DIPHENHYDRAMINE HYDROCHLORIDE 50 MG/ML
25 INJECTION INTRAMUSCULAR; INTRAVENOUS EVERY 6 HOURS PRN
Status: DISCONTINUED | OUTPATIENT
Start: 2023-11-02 | End: 2023-11-02

## 2023-11-02 RX ORDER — SODIUM CHLORIDE, SODIUM GLUCONATE, SODIUM ACETATE, POTASSIUM CHLORIDE AND MAGNESIUM CHLORIDE 30; 37; 368; 526; 502 MG/100ML; MG/100ML; MG/100ML; MG/100ML; MG/100ML
INJECTION, SOLUTION INTRAVENOUS CONTINUOUS
Status: DISCONTINUED | OUTPATIENT
Start: 2023-11-02 | End: 2023-11-02

## 2023-11-02 RX ORDER — COCAINE HYDROCHLORIDE 40 MG/ML
SOLUTION NASAL
Status: DISCONTINUED | OUTPATIENT
Start: 2023-11-02 | End: 2023-11-02 | Stop reason: HOSPADM

## 2023-11-02 RX ORDER — SILVER NITRATE 38.21; 12.74 MG/1; MG/1
STICK TOPICAL
Status: DISCONTINUED
Start: 2023-11-02 | End: 2023-11-02 | Stop reason: HOSPADM

## 2023-11-02 RX ORDER — SILVER NITRATE 38.21; 12.74 MG/1; MG/1
STICK TOPICAL
Status: DISCONTINUED | OUTPATIENT
Start: 2023-11-02 | End: 2023-11-02 | Stop reason: HOSPADM

## 2023-11-02 RX ORDER — PROMETHAZINE HYDROCHLORIDE 25 MG/ML
INJECTION, SOLUTION INTRAMUSCULAR; INTRAVENOUS
Status: DISCONTINUED | OUTPATIENT
Start: 2023-11-02 | End: 2023-11-02

## 2023-11-02 RX ORDER — ONDANSETRON HYDROCHLORIDE 2 MG/ML
INJECTION, SOLUTION INTRAMUSCULAR; INTRAVENOUS
Status: DISCONTINUED | OUTPATIENT
Start: 2023-11-02 | End: 2023-11-02

## 2023-11-02 RX ORDER — METHOCARBAMOL 100 MG/ML
1000 INJECTION, SOLUTION INTRAMUSCULAR; INTRAVENOUS ONCE AS NEEDED
Status: COMPLETED | OUTPATIENT
Start: 2023-11-02 | End: 2023-11-02

## 2023-11-02 RX ORDER — ACETAMINOPHEN 10 MG/ML
INJECTION, SOLUTION INTRAVENOUS
Status: DISCONTINUED | OUTPATIENT
Start: 2023-11-02 | End: 2023-11-02

## 2023-11-02 RX ORDER — LIDOCAINE HYDROCHLORIDE 10 MG/ML
INJECTION, SOLUTION EPIDURAL; INFILTRATION; INTRACAUDAL; PERINEURAL
Status: DISCONTINUED | OUTPATIENT
Start: 2023-11-02 | End: 2023-11-02

## 2023-11-02 RX ORDER — HYDROMORPHONE HYDROCHLORIDE 2 MG/ML
0.4 INJECTION, SOLUTION INTRAMUSCULAR; INTRAVENOUS; SUBCUTANEOUS EVERY 5 MIN PRN
Status: DISCONTINUED | OUTPATIENT
Start: 2023-11-02 | End: 2023-11-02

## 2023-11-02 RX ORDER — ROCURONIUM BROMIDE 10 MG/ML
INJECTION, SOLUTION INTRAVENOUS
Status: DISCONTINUED | OUTPATIENT
Start: 2023-11-02 | End: 2023-11-02

## 2023-11-02 RX ORDER — CEFAZOLIN SODIUM 1 G/3ML
2 INJECTION, POWDER, FOR SOLUTION INTRAMUSCULAR; INTRAVENOUS
Status: COMPLETED | OUTPATIENT
Start: 2023-11-02 | End: 2023-11-02

## 2023-11-02 RX ORDER — OXYMETAZOLINE HCL 0.05 %
2 SPRAY, NON-AEROSOL (ML) NASAL
Status: DISCONTINUED | OUTPATIENT
Start: 2023-11-02 | End: 2023-11-02

## 2023-11-02 RX ORDER — PROPOFOL 10 MG/ML
VIAL (ML) INTRAVENOUS
Status: DISCONTINUED | OUTPATIENT
Start: 2023-11-02 | End: 2023-11-02

## 2023-11-02 RX ORDER — LIDOCAINE HYDROCHLORIDE AND EPINEPHRINE 20; 10 MG/ML; UG/ML
INJECTION, SOLUTION INFILTRATION; PERINEURAL
Status: DISCONTINUED | OUTPATIENT
Start: 2023-11-02 | End: 2023-11-02 | Stop reason: HOSPADM

## 2023-11-02 RX ORDER — LIDOCAINE HYDROCHLORIDE 10 MG/ML
1 INJECTION, SOLUTION EPIDURAL; INFILTRATION; INTRACAUDAL; PERINEURAL ONCE
Status: DISCONTINUED | OUTPATIENT
Start: 2023-11-02 | End: 2023-11-02

## 2023-11-02 RX ORDER — ONDANSETRON 2 MG/ML
4 INJECTION INTRAMUSCULAR; INTRAVENOUS DAILY PRN
Status: DISCONTINUED | OUTPATIENT
Start: 2023-11-02 | End: 2023-11-02

## 2023-11-02 RX ORDER — DEXAMETHASONE SODIUM PHOSPHATE 4 MG/ML
INJECTION, SOLUTION INTRA-ARTICULAR; INTRALESIONAL; INTRAMUSCULAR; INTRAVENOUS; SOFT TISSUE
Status: DISCONTINUED | OUTPATIENT
Start: 2023-11-02 | End: 2023-11-02

## 2023-11-02 RX ORDER — FENTANYL CITRATE 50 UG/ML
INJECTION, SOLUTION INTRAMUSCULAR; INTRAVENOUS
Status: DISCONTINUED | OUTPATIENT
Start: 2023-11-02 | End: 2023-11-02

## 2023-11-02 RX ORDER — ACETAMINOPHEN 10 MG/ML
1000 INJECTION, SOLUTION INTRAVENOUS ONCE
Status: DISCONTINUED | OUTPATIENT
Start: 2023-11-02 | End: 2023-11-02

## 2023-11-02 RX ORDER — HYDROMORPHONE HYDROCHLORIDE 2 MG/ML
0.2 INJECTION, SOLUTION INTRAMUSCULAR; INTRAVENOUS; SUBCUTANEOUS EVERY 5 MIN PRN
Status: DISCONTINUED | OUTPATIENT
Start: 2023-11-02 | End: 2023-11-02

## 2023-11-02 RX ORDER — COCAINE HYDROCHLORIDE 40 MG/ML
SOLUTION NASAL
Status: DISCONTINUED
Start: 2023-11-02 | End: 2023-11-02 | Stop reason: HOSPADM

## 2023-11-02 RX ORDER — MIDAZOLAM HYDROCHLORIDE 1 MG/ML
2 INJECTION INTRAMUSCULAR; INTRAVENOUS ONCE AS NEEDED
Status: COMPLETED | OUTPATIENT
Start: 2023-11-02 | End: 2023-11-02

## 2023-11-02 RX ADMIN — ROCURONIUM BROMIDE 40 MG: 50 INJECTION INTRAVENOUS at 10:11

## 2023-11-02 RX ADMIN — DEXMEDETOMIDINE HYDROCHLORIDE 6 MCG: 400 INJECTION INTRAVENOUS at 10:11

## 2023-11-02 RX ADMIN — DEXMEDETOMIDINE HYDROCHLORIDE 4 MCG: 400 INJECTION INTRAVENOUS at 11:11

## 2023-11-02 RX ADMIN — DEXMEDETOMIDINE HYDROCHLORIDE 2 MCG: 400 INJECTION INTRAVENOUS at 11:11

## 2023-11-02 RX ADMIN — SODIUM CHLORIDE, POTASSIUM CHLORIDE, SODIUM LACTATE AND CALCIUM CHLORIDE: 600; 310; 30; 20 INJECTION, SOLUTION INTRAVENOUS at 12:11

## 2023-11-02 RX ADMIN — SODIUM CHLORIDE, POTASSIUM CHLORIDE, SODIUM LACTATE AND CALCIUM CHLORIDE: 600; 310; 30; 20 INJECTION, SOLUTION INTRAVENOUS at 11:11

## 2023-11-02 RX ADMIN — DEXMEDETOMIDINE HYDROCHLORIDE 4 MCG: 400 INJECTION INTRAVENOUS at 10:11

## 2023-11-02 RX ADMIN — HYDROMORPHONE HYDROCHLORIDE 0.4 MG: 2 INJECTION INTRAMUSCULAR; INTRAVENOUS; SUBCUTANEOUS at 12:11

## 2023-11-02 RX ADMIN — SODIUM CHLORIDE, POTASSIUM CHLORIDE, SODIUM LACTATE AND CALCIUM CHLORIDE: 600; 310; 30; 20 INJECTION, SOLUTION INTRAVENOUS at 10:11

## 2023-11-02 RX ADMIN — LIDOCAINE HYDROCHLORIDE 20 MG: 10 INJECTION, SOLUTION EPIDURAL; INFILTRATION; INTRACAUDAL; PERINEURAL at 10:11

## 2023-11-02 RX ADMIN — ROCURONIUM BROMIDE 10 MG: 50 INJECTION INTRAVENOUS at 10:11

## 2023-11-02 RX ADMIN — METHOCARBAMOL 1000 MG: 100 INJECTION INTRAMUSCULAR; INTRAVENOUS at 12:11

## 2023-11-02 RX ADMIN — ACETAMINOPHEN 1000 MG: 10 INJECTION, SOLUTION INTRAVENOUS at 11:11

## 2023-11-02 RX ADMIN — CEFAZOLIN 2 G: 330 INJECTION, POWDER, FOR SOLUTION INTRAMUSCULAR; INTRAVENOUS at 10:11

## 2023-11-02 RX ADMIN — DEXAMETHASONE SODIUM PHOSPHATE 12 MG: 4 INJECTION, SOLUTION INTRA-ARTICULAR; INTRALESIONAL; INTRAMUSCULAR; INTRAVENOUS; SOFT TISSUE at 10:11

## 2023-11-02 RX ADMIN — FENTANYL CITRATE 50 MCG: 50 INJECTION, SOLUTION INTRAMUSCULAR; INTRAVENOUS at 10:11

## 2023-11-02 RX ADMIN — PROMETHAZINE HYDROCHLORIDE 6.25 MG: 25 INJECTION INTRAMUSCULAR; INTRAVENOUS at 11:11

## 2023-11-02 RX ADMIN — Medication 120 MG: at 10:11

## 2023-11-02 RX ADMIN — ONDANSETRON 4 MG: 2 INJECTION INTRAMUSCULAR; INTRAVENOUS at 11:11

## 2023-11-02 RX ADMIN — Medication 50 MG: at 10:11

## 2023-11-02 RX ADMIN — MIDAZOLAM HYDROCHLORIDE 2 MG: 1 INJECTION, SOLUTION INTRAMUSCULAR; INTRAVENOUS at 10:11

## 2023-11-02 RX ADMIN — SUGAMMADEX 200 MG: 100 INJECTION, SOLUTION INTRAVENOUS at 11:11

## 2023-11-02 NOTE — ANESTHESIA PREPROCEDURE EVALUATION
11/02/2023  Greta Chun is a 73 y.o., female , who presents with chronic sinusitis for FESS today.  She has done well in the past under general anesthesia.  She denies cardiopulmonary complaints today.  She is easily capable of 4 mets without issues.    Pre-op Assessment    I have reviewed the Patient Summary Reports.     I have reviewed the Nursing Notes. I have reviewed the NPO Status.   I have reviewed the Medications.     Review of Systems  Anesthesia Hx:  No problems with previous Anesthesia    Social:  Non-Smoker    Cardiovascular:   Exercise tolerance: good Hypertension  Denies Angina.    Pulmonary:  Pulmonary Normal  Denies Shortness of breath.    Hepatic/GI:  Hepatic/GI Normal    Musculoskeletal:   Arthritis   Spine Disorders: lumbar    Neurological:   Neuromuscular Disease,    Endocrine:   Borderline DM       Physical Exam  General: Alert, Oriented, Well nourished and Cooperative    Airway:  Mallampati: II   Mouth Opening: Normal  TM Distance: 4 - 6 cm  Tongue: Normal  Neck ROM: Normal ROM    Dental:  Intact    Chest/Lungs:  Normal Respiratory Rate    Heart:  Rate: Normal  Rhythm: Regular Rhythm        Anesthesia Plan  Type of Anesthesia, risks & benefits discussed:    Anesthesia Type: Gen ETT  Intra-op Monitoring Plan: Standard ASA Monitors  Post Op Pain Control Plan: multimodal analgesia  Induction:  IV  Airway Plan: , Post-Induction  Informed Consent: Informed consent signed with the Patient and all parties understand the risks and agree with anesthesia plan.  All questions answered.   ASA Score: 2  Day of Surgery Review of History & Physical: H&P Update referred to the surgeon/provider.  Anesthesia Plan Notes: Nasal Cannula vs O2 Via Facemask  TIVA    Ready For Surgery From Anesthesia Perspective.     .

## 2023-11-02 NOTE — ANESTHESIA PROCEDURE NOTES
Intubation    Date/Time: 11/2/2023 10:33 AM    Performed by: Dory Castellon CRNA  Authorized by: Mark Handley MD    Intubation:     Induction:  Intravenous    Intubated:  Postinduction    Mask Ventilation:  Easy mask    Attempts:  1    Attempted By:  CRNA    Method of Intubation:  Direct    Blade:  Adam 3    Laryngeal View Grade: Grade I - full view of cords      Difficult Airway Encountered?: No      Complications:  None    Airway Device:  Oral endotracheal tube    Airway Device Size:  6.5    Style/Cuff Inflation:  Cuffed (inflated to minimal occlusive pressure)    Inflation Amount (mL):  6    Tube secured:  21    Secured at:  The lips    Placement Verified By:  Capnometry    Complicating Factors:  None    Findings Post-Intubation:  BS equal bilateral

## 2023-11-02 NOTE — TRANSFER OF CARE
Anesthesia Transfer of Care Note    Patient: Greta Luent    Procedure(s) Performed: Procedure(s) (LRB):  FESS, USING COMPUTER-ASSISTED NAVIGATION (N/A)  EXCISION, NASAL TURBINATE, SUBMUCOSAL (Bilateral)    Patient location: PACU    Anesthesia Type: general    Transport from OR: Transported from OR on room air with adequate spontaneous ventilation    Post pain: adequate analgesia    Post assessment: no apparent anesthetic complications and tolerated procedure well    Post vital signs: stable    Level of consciousness: sedated    Nausea/Vomiting: no nausea/vomiting    Complications: none    Transfer of care protocol was followed

## 2023-11-03 VITALS
DIASTOLIC BLOOD PRESSURE: 67 MMHG | TEMPERATURE: 97 F | SYSTOLIC BLOOD PRESSURE: 125 MMHG | OXYGEN SATURATION: 96 % | HEIGHT: 66 IN | RESPIRATION RATE: 16 BRPM | WEIGHT: 147.06 LBS | BODY MASS INDEX: 23.64 KG/M2 | HEART RATE: 63 BPM

## 2023-11-03 LAB
GRAM STN SPEC: NORMAL
M AVIUM PARATB TISS QL ZN STN: NORMAL

## 2023-11-05 LAB — BACTERIA SPEC ANAEROBE CULT: NORMAL

## 2023-11-06 LAB — BACTERIA TISS AEROBE CULT: ABNORMAL

## 2023-11-07 LAB — PSYCHE PATHOLOGY RESULT: NORMAL

## 2023-11-08 NOTE — ANESTHESIA POSTPROCEDURE EVALUATION
Anesthesia Post Evaluation    Patient: Greta Luent    Procedure(s) Performed: Procedure(s) (LRB):  FESS, USING COMPUTER-ASSISTED NAVIGATION (N/A)  EXCISION, NASAL TURBINATE, SUBMUCOSAL (Bilateral)    Final Anesthesia Type: general      Patient location during evaluation: PACU  Patient participation: Yes- Able to Participate  Level of consciousness: awake and alert  Post-procedure vital signs: reviewed and stable  Pain management: adequate  Airway patency: patent      Anesthetic complications: no      Cardiovascular status: blood pressure returned to baseline  Respiratory status: unassisted  Hydration status: euvolemic  Follow-up not needed.          Vitals Value Taken Time   /67 11/02/23 1543   Temp 36.2 °C (97.2 °F) 11/02/23 1311   Pulse 63 11/02/23 1543   Resp 16 11/02/23 1412   SpO2 96 % 11/02/23 1543         Event Time   Out of Recovery 13:09:00         Pain/Cydney Score: No data recorded

## 2023-11-14 NOTE — OP NOTE
[facility] [address] [place] [address]    PATIENT NAME:      DEO VIDES   YOB: 1950  CSN:               [id number]  MRN:               [id number]  ADMIT DATE:        11/02/2023 08:31:00  PHYSICIAN:         Juan Valdez                          OPERATIVE REPORT      DATE OF SURGERY:    11/02/2023 00:00:00    SURGEON:  Juan Valdez    PREOPERATIVE DIAGNOSES:  Chronic pansinusitis, intranasal, intrasinus polyposis,   nasal septal deviation, turbinate hypertrophy.    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    OPERATION PERFORMED:  Functional endoscopic sinus surgery, bilateral anterior   and posterior ethmoidectomy, maxillary antrostomy, septal antrostomy, wide   excision of sphenoid sinus cavity with a removal of the posterior nasal septum   in terms of removal of the exposing of the sphenoid sinus, intraoperative   navigation.    PROCEDURE IN DETAIL:  With proper consent information, the patient was brought   to the operating room, placed on operating room table in supine position.  With   satisfactory endotracheal intubation and general anesthesia, the patient was   placed asleep.  The septum was straight.  The nose was packed with 200 mg of   cocaine and regionally anesthetized with 2 cc of 2% xylocaine with epinephrine   with the septum as well.  The right infundibulotomy was done.  The anterior   sinuses were opened.  This was carried down to the ground lamella.  The   posterior sinuses were opened.  There was a large amount of chronically inflamed   inspissated material especially in the posterior ethmoid sinuses.  This was   carried up to the fovea ethmoidalis.  The frontal ethmoid area was opened   widely.  At that point, the  sphenoid sinus was addressed.  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  However, the anterior wall   totally was removed as well as the intrasinus septum on both sides as well as   the posterior compartment of the sphenoid sinus and there was a large fungal   ball sit in this area, which was removed and debrided and identical procedure on   the left sinuses were was carried out.  Cultures were sent for tissue as well   as tissue cultures.  A submucosal resection of inferior turbinate was done.  The   patient tolerated procedure well.  She was packed with nasal pore, transferred   back to recovery room awake, alert, responsive.      This patient is status post FESS. The patient tolerated the procedure well and is discharged home with follow up instructions.  ______________________________  Juan DE LUNA/VALERIO  DD:  11/13/2023  Time:  01:18PM  DT:  11/13/2023  Time:  08:42PM  Job #:  [id number]      OPERATIVE REPORT

## 2023-12-04 LAB — FUNGUS SPEC CULT: ABNORMAL

## 2023-12-17 LAB — MYCOBACTERIUM SPEC QL CULT: NORMAL

## 2024-02-26 ENCOUNTER — LAB REQUISITION (OUTPATIENT)
Dept: LAB | Facility: HOSPITAL | Age: 74
End: 2024-02-26
Payer: MEDICARE

## 2024-02-26 DIAGNOSIS — J32.9 CHRONIC SINUSITIS, UNSPECIFIED: ICD-10-CM

## 2024-02-26 PROCEDURE — 87077 CULTURE AEROBIC IDENTIFY: CPT | Performed by: OTOLARYNGOLOGY

## 2024-02-26 PROCEDURE — 87102 FUNGUS ISOLATION CULTURE: CPT | Performed by: OTOLARYNGOLOGY

## 2024-03-01 LAB — BACTERIA SINUS CULT: ABNORMAL

## 2024-04-01 LAB — FUNGUS SPEC CULT: NORMAL

## 2024-05-06 ENCOUNTER — HOSPITAL ENCOUNTER (OUTPATIENT)
Dept: RADIOLOGY | Facility: HOSPITAL | Age: 74
Discharge: HOME | End: 2024-05-06
Attending: OTOLARYNGOLOGY
Payer: MEDICARE

## 2024-05-06 DIAGNOSIS — J32.8 OTHER CHRONIC SINUSITIS: ICD-10-CM

## 2024-05-06 DIAGNOSIS — Z01.818 OTHER SPECIFIED PRE-OPERATIVE EXAMINATION: ICD-10-CM

## 2024-05-06 DIAGNOSIS — Z01.818 OTHER SPECIFIED PRE-OPERATIVE EXAMINATION: Primary | ICD-10-CM

## 2024-05-06 DIAGNOSIS — Z79.01 LONG TERM (CURRENT) USE OF ANTICOAGULANTS: ICD-10-CM

## 2024-05-06 PROCEDURE — 71046 X-RAY EXAM CHEST 2 VIEWS: CPT | Mod: TC

## 2024-05-07 RX ORDER — FAMOTIDINE 10 MG/1
20 TABLET ORAL 2 TIMES DAILY
COMMUNITY

## 2024-05-09 NOTE — DISCHARGE INSTRUCTIONS
Endoscopic Sinus Surgery Discharge Instructions       What care is needed at home?   Do not blow your nose or sneeze for 7 to 10 days. If you must sneeze, keep your mouth open.  Change your dressing as needed for the next few days. Once the bleeding stops then you will not need a dressing anymore.   Breathe through your mouth for the first few days.  Place an ice pack wrapped in a towel over the painful part. Never put ice right on the skin. Do not leave the ice on more than 10 to 15 minutes at a time. This will help relieve pain and swelling.  Protect your nose from injury.  Avoid activities that put pressure on your face, like bending over or holding your breath.  You can take a bath or shower but make sure it's not too hot.   Ask the doctor when you should start to use a saline rinse for your nose. This may be right after any packing has been removed. You may need to use the rinse 3 to 4 times each day to help prevent crusts from forming inside of your nose.  You can use a humidifier.  Ask the doctor when you can return to your normal activities or return to work.    What follow-up care is needed?   Keep your scheduled follow up appointment.   Your doctor may remove any packing or stents (small tubes) at one of these visits. Follow the instructions your doctor told you.    Will physical activity be limited?   Talk with your doctor about the right amount of activity for you.  Do not lift anything over 10 pounds (4.5 kg) for 2 to 4 weeks.  Do not bend over toward the floor.  Avoid activities that may jerk your head, like playing sports.    What problems could happen?   Infection  Bleeding  Numbness in the nose  Eye problems  Bruising around eyes  Cerebrospinal fluid leaks from nose    When do I need to call the doctor?   Signs of infection. These include a fever of 100.4°F (38°C) or higher, chills.  Lots of bleeding from your nose  Packing comes out before it should  Upset stomach and throwing up not helped with  drugs  Too much pain or headache  Swelling or redness of the eyes or nose  Clear fluid draining from one side of your nose  Cough, shortness of breath, chest pain  You are not feeling better in 2 to 3 days or you are feeling worse

## 2024-05-16 ENCOUNTER — ANESTHESIA EVENT (OUTPATIENT)
Dept: SURGERY | Facility: HOSPITAL | Age: 74
End: 2024-05-16
Payer: MEDICARE

## 2024-05-16 ENCOUNTER — HOSPITAL ENCOUNTER (OUTPATIENT)
Facility: HOSPITAL | Age: 74
Discharge: HOME | End: 2024-05-16
Attending: OTOLARYNGOLOGY | Admitting: OTOLARYNGOLOGY
Payer: MEDICARE

## 2024-05-16 ENCOUNTER — ANESTHESIA (OUTPATIENT)
Dept: SURGERY | Facility: HOSPITAL | Age: 74
End: 2024-05-16
Payer: MEDICARE

## 2024-05-16 DIAGNOSIS — J32.8 OTHER CHRONIC SINUSITIS: ICD-10-CM

## 2024-05-16 PROCEDURE — 71000015 HC POSTOP RECOV 1ST HR: Performed by: OTOLARYNGOLOGY

## 2024-05-16 PROCEDURE — 71000016 HC POSTOP RECOV ADDL HR: Performed by: OTOLARYNGOLOGY

## 2024-05-16 PROCEDURE — 63600175 PHARM REV CODE 636 W HCPCS: Performed by: ANESTHESIOLOGY

## 2024-05-16 PROCEDURE — 88311 DECALCIFY TISSUE: CPT | Mod: 59

## 2024-05-16 PROCEDURE — 37000009 HC ANESTHESIA EA ADD 15 MINS: Performed by: OTOLARYNGOLOGY

## 2024-05-16 PROCEDURE — 87206 SMEAR FLUORESCENT/ACID STAI: CPT | Performed by: OTOLARYNGOLOGY

## 2024-05-16 PROCEDURE — D9220A PRA ANESTHESIA: Mod: ANES,,, | Performed by: ANESTHESIOLOGY

## 2024-05-16 PROCEDURE — 37000008 HC ANESTHESIA 1ST 15 MINUTES: Performed by: OTOLARYNGOLOGY

## 2024-05-16 PROCEDURE — 63600175 PHARM REV CODE 636 W HCPCS: Performed by: OTOLARYNGOLOGY

## 2024-05-16 PROCEDURE — 63600175 PHARM REV CODE 636 W HCPCS

## 2024-05-16 PROCEDURE — 25000003 PHARM REV CODE 250: Performed by: OTOLARYNGOLOGY

## 2024-05-16 PROCEDURE — 71000033 HC RECOVERY, INTIAL HOUR: Performed by: OTOLARYNGOLOGY

## 2024-05-16 PROCEDURE — 87075 CULTR BACTERIA EXCEPT BLOOD: CPT | Performed by: OTOLARYNGOLOGY

## 2024-05-16 PROCEDURE — 87070 CULTURE OTHR SPECIMN AEROBIC: CPT | Performed by: OTOLARYNGOLOGY

## 2024-05-16 PROCEDURE — 27201423 OPTIME MED/SURG SUP & DEVICES STERILE SUPPLY: Performed by: OTOLARYNGOLOGY

## 2024-05-16 PROCEDURE — 63600175 PHARM REV CODE 636 W HCPCS: Performed by: NURSE ANESTHETIST, CERTIFIED REGISTERED

## 2024-05-16 PROCEDURE — 36000710: Performed by: OTOLARYNGOLOGY

## 2024-05-16 PROCEDURE — D9220A PRA ANESTHESIA: Mod: CRNA,,, | Performed by: NURSE ANESTHETIST, CERTIFIED REGISTERED

## 2024-05-16 PROCEDURE — 36000711: Performed by: OTOLARYNGOLOGY

## 2024-05-16 PROCEDURE — 25000003 PHARM REV CODE 250: Performed by: NURSE ANESTHETIST, CERTIFIED REGISTERED

## 2024-05-16 PROCEDURE — 88304 TISSUE EXAM BY PATHOLOGIST: CPT | Performed by: OTOLARYNGOLOGY

## 2024-05-16 RX ORDER — FENTANYL CITRATE 50 UG/ML
INJECTION, SOLUTION INTRAMUSCULAR; INTRAVENOUS
Status: DISCONTINUED | OUTPATIENT
Start: 2024-05-16 | End: 2024-05-16

## 2024-05-16 RX ORDER — OXYMETAZOLINE HCL 0.05 %
2 SPRAY, NON-AEROSOL (ML) NASAL EVERY 4 HOURS PRN
Status: DISCONTINUED | OUTPATIENT
Start: 2024-05-16 | End: 2024-05-16 | Stop reason: HOSPADM

## 2024-05-16 RX ORDER — LABETALOL HYDROCHLORIDE 5 MG/ML
5 INJECTION, SOLUTION INTRAVENOUS ONCE
Status: DISCONTINUED | OUTPATIENT
Start: 2024-05-16 | End: 2024-05-16 | Stop reason: HOSPADM

## 2024-05-16 RX ORDER — DIPHENHYDRAMINE HYDROCHLORIDE 50 MG/ML
25 INJECTION INTRAMUSCULAR; INTRAVENOUS EVERY 6 HOURS PRN
Status: DISCONTINUED | OUTPATIENT
Start: 2024-05-16 | End: 2024-05-16 | Stop reason: HOSPADM

## 2024-05-16 RX ORDER — OXYMETAZOLINE HCL 0.05 %
SPRAY, NON-AEROSOL (ML) NASAL
Status: DISCONTINUED
Start: 2024-05-16 | End: 2024-05-16 | Stop reason: WASHOUT

## 2024-05-16 RX ORDER — MORPHINE SULFATE 4 MG/ML
2 INJECTION, SOLUTION INTRAMUSCULAR; INTRAVENOUS EVERY 10 MIN PRN
Status: DISCONTINUED | OUTPATIENT
Start: 2024-05-16 | End: 2024-05-16 | Stop reason: HOSPADM

## 2024-05-16 RX ORDER — ACETAMINOPHEN 10 MG/ML
1000 INJECTION, SOLUTION INTRAVENOUS ONCE
Status: COMPLETED | OUTPATIENT
Start: 2024-05-16 | End: 2024-05-16

## 2024-05-16 RX ORDER — MIDAZOLAM HYDROCHLORIDE 2 MG/2ML
2 INJECTION, SOLUTION INTRAMUSCULAR; INTRAVENOUS ONCE AS NEEDED
Status: COMPLETED | OUTPATIENT
Start: 2024-05-16 | End: 2024-05-16

## 2024-05-16 RX ORDER — SILVER NITRATE 38.21; 12.74 MG/1; MG/1
STICK TOPICAL
Status: DISCONTINUED
Start: 2024-05-16 | End: 2024-05-16 | Stop reason: WASHOUT

## 2024-05-16 RX ORDER — METHOCARBAMOL 100 MG/ML
1000 INJECTION, SOLUTION INTRAMUSCULAR; INTRAVENOUS ONCE AS NEEDED
Status: COMPLETED | OUTPATIENT
Start: 2024-05-16 | End: 2024-05-16

## 2024-05-16 RX ORDER — MEPERIDINE HYDROCHLORIDE 25 MG/ML
50 INJECTION INTRAMUSCULAR; INTRAVENOUS; SUBCUTANEOUS EVERY 4 HOURS PRN
Status: DISCONTINUED | OUTPATIENT
Start: 2024-05-16 | End: 2024-05-16 | Stop reason: HOSPADM

## 2024-05-16 RX ORDER — SODIUM CHLORIDE, SODIUM GLUCONATE, SODIUM ACETATE, POTASSIUM CHLORIDE AND MAGNESIUM CHLORIDE 30; 37; 368; 526; 502 MG/100ML; MG/100ML; MG/100ML; MG/100ML; MG/100ML
INJECTION, SOLUTION INTRAVENOUS CONTINUOUS
Status: DISCONTINUED | OUTPATIENT
Start: 2024-05-16 | End: 2024-05-16 | Stop reason: HOSPADM

## 2024-05-16 RX ORDER — BECLOMETHASONE DIPROPIONATE HFA 80 UG/1
AEROSOL, METERED RESPIRATORY (INHALATION) DAILY
COMMUNITY

## 2024-05-16 RX ORDER — OXYMETAZOLINE HCL 0.05 %
2 SPRAY, NON-AEROSOL (ML) NASAL
Status: COMPLETED | OUTPATIENT
Start: 2024-05-16 | End: 2024-05-16

## 2024-05-16 RX ORDER — SODIUM CHLORIDE, SODIUM LACTATE, POTASSIUM CHLORIDE, CALCIUM CHLORIDE 600; 310; 30; 20 MG/100ML; MG/100ML; MG/100ML; MG/100ML
INJECTION, SOLUTION INTRAVENOUS CONTINUOUS
Status: DISCONTINUED | OUTPATIENT
Start: 2024-05-16 | End: 2024-05-16 | Stop reason: HOSPADM

## 2024-05-16 RX ORDER — LIDOCAINE HYDROCHLORIDE AND EPINEPHRINE 10; 10 MG/ML; UG/ML
INJECTION, SOLUTION INFILTRATION; PERINEURAL
Status: DISCONTINUED | OUTPATIENT
Start: 2024-05-16 | End: 2024-05-16 | Stop reason: HOSPADM

## 2024-05-16 RX ORDER — LIDOCAINE HYDROCHLORIDE AND EPINEPHRINE 10; 10 MG/ML; UG/ML
INJECTION, SOLUTION INFILTRATION; PERINEURAL
Status: DISCONTINUED
Start: 2024-05-16 | End: 2024-05-16 | Stop reason: HOSPADM

## 2024-05-16 RX ORDER — HYDROMORPHONE HYDROCHLORIDE 2 MG/ML
0.4 INJECTION, SOLUTION INTRAMUSCULAR; INTRAVENOUS; SUBCUTANEOUS EVERY 5 MIN PRN
Status: DISCONTINUED | OUTPATIENT
Start: 2024-05-16 | End: 2024-05-16 | Stop reason: HOSPADM

## 2024-05-16 RX ORDER — LABETALOL HYDROCHLORIDE 5 MG/ML
10 INJECTION, SOLUTION INTRAVENOUS ONCE
Status: DISCONTINUED | OUTPATIENT
Start: 2024-05-16 | End: 2024-05-16 | Stop reason: HOSPADM

## 2024-05-16 RX ORDER — HYDRALAZINE HYDROCHLORIDE 20 MG/ML
10 INJECTION INTRAMUSCULAR; INTRAVENOUS EVERY 6 HOURS PRN
Status: DISCONTINUED | OUTPATIENT
Start: 2024-05-16 | End: 2024-05-16 | Stop reason: HOSPADM

## 2024-05-16 RX ORDER — OXYMETAZOLINE HCL 0.05 %
SPRAY, NON-AEROSOL (ML) NASAL
Status: DISCONTINUED | OUTPATIENT
Start: 2024-05-16 | End: 2024-05-16 | Stop reason: HOSPADM

## 2024-05-16 RX ORDER — ROCURONIUM BROMIDE 10 MG/ML
INJECTION, SOLUTION INTRAVENOUS
Status: DISCONTINUED | OUTPATIENT
Start: 2024-05-16 | End: 2024-05-16

## 2024-05-16 RX ORDER — HYDROCODONE BITARTRATE AND ACETAMINOPHEN 7.5; 325 MG/1; MG/1
1 TABLET ORAL EVERY 4 HOURS PRN
Status: DISCONTINUED | OUTPATIENT
Start: 2024-05-16 | End: 2024-05-16 | Stop reason: HOSPADM

## 2024-05-16 RX ORDER — ONDANSETRON HYDROCHLORIDE 2 MG/ML
INJECTION, SOLUTION INTRAMUSCULAR; INTRAVENOUS
Status: DISCONTINUED | OUTPATIENT
Start: 2024-05-16 | End: 2024-05-16

## 2024-05-16 RX ORDER — LIDOCAINE HYDROCHLORIDE 10 MG/ML
1 INJECTION, SOLUTION EPIDURAL; INFILTRATION; INTRACAUDAL; PERINEURAL ONCE
Status: DISCONTINUED | OUTPATIENT
Start: 2024-05-16 | End: 2024-05-16 | Stop reason: HOSPADM

## 2024-05-16 RX ORDER — LIDOCAINE HYDROCHLORIDE 10 MG/ML
INJECTION, SOLUTION EPIDURAL; INFILTRATION; INTRACAUDAL; PERINEURAL
Status: DISCONTINUED | OUTPATIENT
Start: 2024-05-16 | End: 2024-05-16

## 2024-05-16 RX ORDER — DEXAMETHASONE SODIUM PHOSPHATE 4 MG/ML
INJECTION, SOLUTION INTRA-ARTICULAR; INTRALESIONAL; INTRAMUSCULAR; INTRAVENOUS; SOFT TISSUE
Status: DISCONTINUED | OUTPATIENT
Start: 2024-05-16 | End: 2024-05-16

## 2024-05-16 RX ORDER — CEFAZOLIN SODIUM 1 G/3ML
2 INJECTION, POWDER, FOR SOLUTION INTRAMUSCULAR; INTRAVENOUS
Status: COMPLETED | OUTPATIENT
Start: 2024-05-16 | End: 2024-05-16

## 2024-05-16 RX ORDER — EPHEDRINE SULFATE 50 MG/ML
INJECTION, SOLUTION INTRAVENOUS
Status: DISCONTINUED | OUTPATIENT
Start: 2024-05-16 | End: 2024-05-16

## 2024-05-16 RX ORDER — PROPOFOL 10 MG/ML
VIAL (ML) INTRAVENOUS
Status: DISCONTINUED | OUTPATIENT
Start: 2024-05-16 | End: 2024-05-16

## 2024-05-16 RX ORDER — PROMETHAZINE HYDROCHLORIDE 25 MG/ML
INJECTION, SOLUTION INTRAMUSCULAR; INTRAVENOUS
Status: DISCONTINUED | OUTPATIENT
Start: 2024-05-16 | End: 2024-05-16

## 2024-05-16 RX ORDER — MEPERIDINE HYDROCHLORIDE 25 MG/ML
75 INJECTION INTRAMUSCULAR; INTRAVENOUS; SUBCUTANEOUS EVERY 4 HOURS PRN
Status: DISCONTINUED | OUTPATIENT
Start: 2024-05-16 | End: 2024-05-16 | Stop reason: HOSPADM

## 2024-05-16 RX ORDER — ONDANSETRON HYDROCHLORIDE 2 MG/ML
4 INJECTION, SOLUTION INTRAVENOUS DAILY PRN
Status: DISCONTINUED | OUTPATIENT
Start: 2024-05-16 | End: 2024-05-16 | Stop reason: HOSPADM

## 2024-05-16 RX ORDER — MIDAZOLAM HYDROCHLORIDE 1 MG/ML
INJECTION INTRAMUSCULAR; INTRAVENOUS
Status: COMPLETED
Start: 2024-05-16 | End: 2024-05-16

## 2024-05-16 RX ADMIN — MIDAZOLAM HYDROCHLORIDE 2 MG: 2 INJECTION, SOLUTION INTRAMUSCULAR; INTRAVENOUS at 07:05

## 2024-05-16 RX ADMIN — METHOCARBAMOL 1000 MG: 100 INJECTION INTRAMUSCULAR; INTRAVENOUS at 09:05

## 2024-05-16 RX ADMIN — OXYMETAZOLINE HYDROCHLORIDE 2 SPRAY: 0.05 SPRAY NASAL at 06:05

## 2024-05-16 RX ADMIN — SUGAMMADEX 120 MG: 100 INJECTION, SOLUTION INTRAVENOUS at 09:05

## 2024-05-16 RX ADMIN — DEXMEDETOMIDINE HYDROCHLORIDE 1 MCG: 400 INJECTION INTRAVENOUS at 08:05

## 2024-05-16 RX ADMIN — FENTANYL CITRATE 25 MCG: 50 INJECTION, SOLUTION INTRAMUSCULAR; INTRAVENOUS at 09:05

## 2024-05-16 RX ADMIN — MIDAZOLAM HYDROCHLORIDE 2 MG: 1 INJECTION, SOLUTION INTRAMUSCULAR; INTRAVENOUS at 07:05

## 2024-05-16 RX ADMIN — EPHEDRINE SULFATE 15 MG: 50 INJECTION INTRAVENOUS at 08:05

## 2024-05-16 RX ADMIN — DEXMEDETOMIDINE HYDROCHLORIDE 1 MCG: 400 INJECTION INTRAVENOUS at 09:05

## 2024-05-16 RX ADMIN — DEXAMETHASONE SODIUM PHOSPHATE 12 MG: 4 INJECTION, SOLUTION INTRA-ARTICULAR; INTRALESIONAL; INTRAMUSCULAR; INTRAVENOUS; SOFT TISSUE at 07:05

## 2024-05-16 RX ADMIN — ONDANSETRON 4 MG: 2 INJECTION INTRAMUSCULAR; INTRAVENOUS at 08:05

## 2024-05-16 RX ADMIN — EPHEDRINE SULFATE 20 MG: 50 INJECTION INTRAVENOUS at 08:05

## 2024-05-16 RX ADMIN — PROPOFOL 140 MG: 10 INJECTION, EMULSION INTRAVENOUS at 07:05

## 2024-05-16 RX ADMIN — DEXMEDETOMIDINE HYDROCHLORIDE 1 MCG: 400 INJECTION INTRAVENOUS at 07:05

## 2024-05-16 RX ADMIN — SODIUM CHLORIDE, POTASSIUM CHLORIDE, SODIUM LACTATE AND CALCIUM CHLORIDE: 600; 310; 30; 20 INJECTION, SOLUTION INTRAVENOUS at 06:05

## 2024-05-16 RX ADMIN — LIDOCAINE HYDROCHLORIDE 50 MG: 10 INJECTION, SOLUTION EPIDURAL; INFILTRATION; INTRACAUDAL; PERINEURAL at 07:05

## 2024-05-16 RX ADMIN — ACETAMINOPHEN 1000 MG: 10 INJECTION, SOLUTION INTRAVENOUS at 08:05

## 2024-05-16 RX ADMIN — FENTANYL CITRATE 25 MCG: 50 INJECTION, SOLUTION INTRAMUSCULAR; INTRAVENOUS at 07:05

## 2024-05-16 RX ADMIN — ROCURONIUM BROMIDE 40 MG: 50 INJECTION INTRAVENOUS at 07:05

## 2024-05-16 RX ADMIN — CEFAZOLIN 2 G: 330 INJECTION, POWDER, FOR SOLUTION INTRAMUSCULAR; INTRAVENOUS at 07:05

## 2024-05-16 RX ADMIN — PROMETHAZINE HYDROCHLORIDE 6.25 MG: 25 INJECTION INTRAMUSCULAR; INTRAVENOUS at 09:05

## 2024-05-16 RX ADMIN — SODIUM CHLORIDE, POTASSIUM CHLORIDE, SODIUM LACTATE AND CALCIUM CHLORIDE: 600; 310; 30; 20 INJECTION, SOLUTION INTRAVENOUS at 08:05

## 2024-05-16 NOTE — PLAN OF CARE
Awaiting fluids from medical supply then will start medication. Respirations even and unlabored at this time. Pt still producing brown thick mucus from stoma sight, pt cleaned multiple times and small amount of barrier cream applied to chest to prevent skin break down currently red. Call light within reach. Pulse ox, cyclic bp and cardiac monitor on pt. Will continue to follow plan of care. Patients discharge to her post op room approved per Dr. Rodriguez and per her Cydney score.

## 2024-05-16 NOTE — CARE UPDATE
Patient awakened,solitario,rejected oral airway, oriented/reassured ehr, she denied c/o, exchanging well, VA/OM are normal.

## 2024-05-16 NOTE — TRANSFER OF CARE
"Anesthesia Transfer of Care Note    Patient: Greta Luent    Procedure(s) Performed: Procedure(s) (LRB):  FESS, USING COMPUTER-ASSISTED NAVIGATION / Revision (N/A)    Patient location: PACU    Anesthesia Type: general    Transport from OR: Transported from OR on room air with adequate spontaneous ventilation    Post pain: adequate analgesia    Post assessment: no apparent anesthetic complications    Post vital signs: stable    Level of consciousness: sedated    Nausea/Vomiting: no nausea/vomiting    Complications: none    Transfer of care protocol was followed      Last vitals: Visit Vitals  /78   Pulse (!) 53   Temp 37 °C (98.6 °F) (Oral)   Resp 18   Ht 5' 6.5" (1.689 m)   Wt 63.5 kg (139 lb 15.9 oz)   SpO2 96%   Breastfeeding No   BMI 22.26 kg/m²     "

## 2024-05-16 NOTE — CARE UPDATE
Received patient from the OR, she is asleep, oral airway in  place, chin lift not necessary at present-nurse remains at bedside with constant assessment and observation, respirations full-regular-deep-clear, hob up 30 degrees.

## 2024-05-16 NOTE — PLAN OF CARE
Vitals signs stable. Pain and nausea well controlled. Discharge criteria met.  Discharged via wheelchair to private auto accompanied by spouse.    Problem: Hypertension Acute  Goal: Blood Pressure Within Desired Range  5/16/2024 1258 by Steph Zeng RN  Outcome: Met  5/16/2024 1014 by Steph Zeng RN  Outcome: Progressing     Problem: Pain Acute  Goal: Optimal Pain Control and Function  5/16/2024 1258 by Steph Zeng RN  Outcome: Met  5/16/2024 1014 by Steph Zeng RN  Outcome: Progressing     Problem: Fall Injury Risk  Goal: Absence of Fall and Fall-Related Injury  5/16/2024 1258 by Steph Zeng RN  Outcome: Met  5/16/2024 1014 by Steph Zeng RN  Outcome: Progressing     Problem: Infection  Goal: Absence of Infection Signs and Symptoms  5/16/2024 1258 by Steph Zeng RN  Outcome: Met  5/16/2024 1014 by Steph Zeng RN  Outcome: Progressing     Problem: Adult Inpatient Plan of Care  Goal: Plan of Care Review  5/16/2024 1258 by Steph Zeng RN  Outcome: Met  5/16/2024 1014 by Steph Zeng RN  Outcome: Progressing  Goal: Patient-Specific Goal (Individualized)  5/16/2024 1258 by Steph Zeng RN  Outcome: Met  5/16/2024 1014 by Steph Zeng RN  Outcome: Progressing  Goal: Absence of Hospital-Acquired Illness or Injury  5/16/2024 1258 by Steph Zeng RN  Outcome: Met  5/16/2024 1014 by Steph Zeng RN  Outcome: Progressing  Goal: Optimal Comfort and Wellbeing  5/16/2024 1258 by Steph Zeng RN  Outcome: Met  5/16/2024 1014 by Steph Zeng RN  Outcome: Progressing  Goal: Readiness for Transition of Care  5/16/2024 1258 by Steph Zeng RN  Outcome: Met  5/16/2024 1014 by Steph Zeng, RN  Outcome: Progressing     Problem: Wound  Goal: Optimal Coping  5/16/2024 1258 by Steph Zeng, RN  Outcome: Met  5/16/2024 1014 by Steph Zeng, RN  Outcome: Progressing  Goal: Optimal Functional Ability  5/16/2024 1258 by  Steph Zeng RN  Outcome: Met  5/16/2024 1014 by Steph Zeng RN  Outcome: Progressing  Goal: Absence of Infection Signs and Symptoms  5/16/2024 1258 by Steph Zeng RN  Outcome: Met  5/16/2024 1014 by Steph Zeng RN  Outcome: Progressing  Goal: Improved Oral Intake  5/16/2024 1258 by Steph Zeng RN  Outcome: Met  5/16/2024 1014 by Steph Zeng RN  Outcome: Progressing  Goal: Optimal Pain Control and Function  5/16/2024 1258 by Steph Zeng RN  Outcome: Met  5/16/2024 1014 by Steph Zeng RN  Outcome: Progressing  Goal: Skin Health and Integrity  5/16/2024 1258 by Steph Zeng RN  Outcome: Met  5/16/2024 1014 by Steph Zeng, RN  Outcome: Progressing  Goal: Optimal Wound Healing  5/16/2024 1258 by Steph Zeng RN  Outcome: Met  5/16/2024 1014 by Steph Zeng RN  Outcome: Progressing

## 2024-05-16 NOTE — CARE UPDATE
Dr. Valdez at bedside talking with and assessing patient. He was updated on her status, v/s, med given. Her daughter Gloria is also at bedside.

## 2024-05-16 NOTE — ANESTHESIA PREPROCEDURE EVALUATION
05/16/2024  Greta Chun is a 74 y.o., female , who presents with chronic sinusitis, s/p FESS procedure.  She is having continued sinus issues.  She is here for revision FESS today.  She has done well in the past under general anesthesia.  She denies cardiopulmonary complaints today.  She is easily capable of 4 mets without issues.  She did well recently under general anesthesia.    Pre-op Assessment    I have reviewed the Patient Summary Reports.     I have reviewed the Nursing Notes. I have reviewed the NPO Status.   I have reviewed the Medications.     Review of Systems  Anesthesia Hx:  No problems with previous Anesthesia                Social:  Non-Smoker       Cardiovascular:  Exercise tolerance: good   Hypertension       Denies Angina.                                  Pulmonary:  Pulmonary Normal      Denies Shortness of breath.                  Hepatic/GI:  Hepatic/GI Normal                 Musculoskeletal:  Arthritis          Spine Disorders: lumbar            Neurological:    Neuromuscular Disease,                                   Endocrine:     Borderline DM            Physical Exam  General: Alert, Oriented, Well nourished and Cooperative    Airway:  Mallampati: II   Mouth Opening: Normal  TM Distance: 4 - 6 cm  Tongue: Normal  Neck ROM: Normal ROM    Dental:  Intact    Chest/Lungs:  Normal Respiratory Rate    Heart:  Rate: Normal  Rhythm: Regular Rhythm        Anesthesia Plan  Type of Anesthesia, risks & benefits discussed:    Anesthesia Type: Gen ETT  Intra-op Monitoring Plan: Standard ASA Monitors  Post Op Pain Control Plan: multimodal analgesia  Induction:  IV  Airway Plan: , Post-Induction  Informed Consent: Informed consent signed with the Patient and all parties understand the risks and agree with anesthesia plan.  All questions answered.   ASA Score: 2  Day of Surgery Review of History &  Physical: H&P Update referred to the surgeon/provider.    Ready For Surgery From Anesthesia Perspective.     .

## 2024-05-16 NOTE — PLAN OF CARE
Transported to Room 16 via bed from PACU.  AA&Ox3. Connected to bedside monitor with alarms on.  at bedside.      Problem: Hypertension Acute  Goal: Blood Pressure Within Desired Range  Outcome: Progressing     Problem: Pain Acute  Goal: Optimal Pain Control and Function  Outcome: Progressing     Problem: Fall Injury Risk  Goal: Absence of Fall and Fall-Related Injury  Outcome: Progressing     Problem: Infection  Goal: Absence of Infection Signs and Symptoms  Outcome: Progressing     Problem: Adult Inpatient Plan of Care  Goal: Plan of Care Review  Outcome: Progressing  Goal: Patient-Specific Goal (Individualized)  Outcome: Progressing  Goal: Absence of Hospital-Acquired Illness or Injury  Outcome: Progressing  Goal: Optimal Comfort and Wellbeing  Outcome: Progressing  Goal: Readiness for Transition of Care  Outcome: Progressing     Problem: Wound  Goal: Optimal Coping  Outcome: Progressing  Goal: Optimal Functional Ability  Outcome: Progressing  Goal: Absence of Infection Signs and Symptoms  Outcome: Progressing  Goal: Improved Oral Intake  Outcome: Progressing  Goal: Optimal Pain Control and Function  Outcome: Progressing  Goal: Skin Health and Integrity  Outcome: Progressing  Goal: Optimal Wound Healing  Outcome: Progressing

## 2024-05-16 NOTE — ANESTHESIA POSTPROCEDURE EVALUATION
Anesthesia Post Evaluation    Patient: Greta Luent    Procedure(s) Performed: Procedure(s) (LRB):  FESS, USING COMPUTER-ASSISTED NAVIGATION / Revision (N/A)    Final Anesthesia Type: general      Patient location during evaluation: PACU  Patient participation: Yes- Able to Participate  Level of consciousness: awake and alert  Post-procedure vital signs: reviewed and stable  Pain management: adequate  Airway patency: patent      Anesthetic complications: no      Cardiovascular status: blood pressure returned to baseline  Respiratory status: unassisted  Hydration status: euvolemic  Follow-up not needed.              Vitals Value Taken Time   /74 05/16/24 1253   Temp 36.7 °C (98.1 °F) 05/16/24 1253   Pulse 61 05/16/24 1253   Resp 18 05/16/24 1253   SpO2 95 % 05/16/24 1253         Event Time   Out of Recovery 10:09:00         Pain/Cydney Score: Cydney Score: 9 (5/16/2024 10:10 AM)

## 2024-05-16 NOTE — ANESTHESIA PROCEDURE NOTES
Intubation    Date/Time: 5/16/2024 7:54 AM    Performed by: Yarelis Vick CRNA  Authorized by: Mark Handley MD    Intubation:     Induction:  Intravenous    Intubated:  Postinduction    Mask Ventilation:  Easy mask    Attempts:  1    Attempted By:  CRNA    Method of Intubation:  Direct    Blade:  Combs 2    Laryngeal View Grade: Grade I - full view of cords      Difficult Airway Encountered?: No      Complications:  None    Airway Device:  Oral endotracheal tube    Airway Device Size:  6.5    Style/Cuff Inflation:  Cuffed (inflated to minimal occlusive pressure)    Inflation Amount (mL):  3    Tube secured:  20    Secured at:  The lips    Placement Verified By:  Capnometry    Complicating Factors:  None    Findings Post-Intubation:  BS equal bilateral  Notes:      ETT cuff pressure [id number]

## 2024-05-17 VITALS
WEIGHT: 140 LBS | HEART RATE: 61 BPM | BODY MASS INDEX: 21.97 KG/M2 | OXYGEN SATURATION: 95 % | RESPIRATION RATE: 18 BRPM | SYSTOLIC BLOOD PRESSURE: 123 MMHG | TEMPERATURE: 98 F | HEIGHT: 67 IN | DIASTOLIC BLOOD PRESSURE: 74 MMHG

## 2024-05-17 LAB
M AVIUM PARATB TISS QL ZN STN: NORMAL
PSYCHE PATHOLOGY RESULT: NORMAL

## 2024-05-18 LAB — BACTERIA TISS AEROBE CULT: ABNORMAL

## 2024-05-19 LAB — BACTERIA SPEC ANAEROBE CULT: NORMAL

## 2024-05-30 LAB — FUNGUS SPEC CULT: NORMAL

## 2024-06-04 NOTE — OP NOTE
[facility] [address] [place] [address]    PATIENT NAME:      DEO VIDES   YOB: 1950  CSN:               [id number]  MRN:               [id number]  ADMIT DATE:        05/16/2024 05:48:00  PHYSICIAN:         Juan Valdez                          OPERATIVE REPORT      DATE OF SURGERY:    05/16/2024 07:07:12    SURGEON:  Juan Valdez    PREOPERATIVE DIAGNOSES:  Chronic recurrent pansinusitis, invasive fungal   sinusitis with osteoneogenesis.    POSTOPERATIVE DIAGNOSES:  Chronic recurrent pansinusitis, invasive fungal   sinusitis with osteoneogenesis.    OPERATION PERFORMED:  Revision endoscopic sinus surgery, bilateral posterior   ethmoidectomy, sphenoidotomy, frontal sinus exploration, left-sided posterior   ethmoidectomy and sphenoidotomy with right posterior ethmoid cultures, Medtronic   sinus nasal irrigation.    INDICATION FOR SURGERY:  Ms. Vides presented to me with a severe pansinusitis.    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  She tolerated the procedure very well.    She was on aggressive medical treatment.  She had extension and opacification of   posterior ethmoid sinuses and sphenoid sinus area.  I was concerned about her   persistent recurrent Aspergillus fungal sinusitis.    DESCRIPTION OF OPERATION:  With proper consent information, the patient was   brought into the operating room and placed on the operating table in supine   position.  With satisfactory endotracheal intubation and general anesthesia, the   patient was placed asleep.  The nose was packed with 200 mg of cocaine and   regionally anesthetized with approximately 2 cc of 2% Xylocaine with   epinephrine.  The right side was addressed 1st endoscopically.  The posterior   ethmoid sinuses showed a lot of recurrent osteoneogenesis  of the area, which was   debrided up to the fovea ethmoidalis.  There was a closure of the sphenoid   sinus on that side as well secondary to the osteoneogenesis, this was removed   with a short biting cutting instrument.  The sphenoid sinus was entered.  This   was done with the navigation system.  The frontal ethmoid recess was debrided   and opened widely, and this area appeared to be extremely patent.  Cultures were   taken of the tissue at that point.  Identical procedure was carried on opposite   side.  Medtronic sinus nasal irrigation and debridement were done of the area,   and she was packed with small half-sized NasoPore packing.  She tolerated the   procedure very well.  She was transferred back to recovery room awake, alert,   and responsive.    This patient is status post revision FESS. The patient tolerated  The procedure well and is discharged home with follow up instructions.    ______________________________  Juan DE LUNA/VALERIO  DD:  06/03/2024  Time:  12:37PM  DT:  06/03/2024  Time:  08:49PM  Job #:  [id number]    cc:    __________        OPERATIVE REPORT

## (undated) DEVICE — CANNULA AIRLIFE ETCO2 NSL 7FT

## (undated) DEVICE — CHLORAPREP 10.5 ML APPLICATOR

## (undated) DEVICE — Device

## (undated) DEVICE — SET TUBE (1) SMARTSITE PRT 104

## (undated) DEVICE — TUBE SUCTION MEDI-VAC STERILE

## (undated) DEVICE — CONTRAST ISOVUE M 200 20ML VIL

## (undated) DEVICE — NDL HYPO REG 25G X 1 1/2

## (undated) DEVICE — SYR W NDL BLN FILL 5ML 18GX1.5

## (undated) DEVICE — HANDPIECE HYDRODEBRIDER

## (undated) DEVICE — TRACKER ENT INSTRUMENT

## (undated) DEVICE — SOL NACL IRR 1000ML BTL

## (undated) DEVICE — NDL QUINCKE S/SU 22GA 5IN

## (undated) DEVICE — SUPPORT ULNA NERVE PROTECTOR

## (undated) DEVICE — SOL NORMAL USPCA 0.9%

## (undated) DEVICE — BLANKET SNUGGLE WARM LOWER BDY

## (undated) DEVICE — SUT 5-0 CHROMIC GUT / P-3

## (undated) DEVICE — GLOVE PROTEXIS LTX MICRO 6.5

## (undated) DEVICE — NDL SPINAL 22GA 3.5 IN QUINCKE

## (undated) DEVICE — COVER PROXIMA MAYO STAND

## (undated) DEVICE — NDL SYR 10ML 18X1.5 LL BLUNT

## (undated) DEVICE — TRACKER PATIENT NON INVASIVE

## (undated) DEVICE — DRESSING NASOPORE X FRM 8CM

## (undated) DEVICE — DRAPE MEDIUM SHEET 40X70IN

## (undated) DEVICE — SET SMARTSITE EXT SMALLBORE NF

## (undated) DEVICE — SYR 3ML LL 18GA 1.5IN

## (undated) DEVICE — KIT SURGIFLO HEMOSTATIC MATRIX

## (undated) DEVICE — SUT PLN GUT 4-0 SC-1SC-1 1

## (undated) DEVICE — KIT ANTIFOG W/SPONG & FLUID

## (undated) DEVICE — BANDAGE ADHESIVE

## (undated) DEVICE — TUBING FOR LABORIE PUMP

## (undated) DEVICE — DRAPE UTILITY W/ TAPE 20X30IN

## (undated) DEVICE — TAPE CURAD PAPER ADH 2INX10YD

## (undated) DEVICE — PACKING NASOPORE NASAL STD 8CM

## (undated) DEVICE — GOWN X-LG STERILE BACK

## (undated) DEVICE — DRESSING NASOPORE 8CM BIORSRBL

## (undated) DEVICE — NDL FLTR 5MCRN BLNT TIP 18GX1

## (undated) DEVICE — GLOVE SIGNATURE MICRO LTX 6.5

## (undated) DEVICE — GLOVE SIGNATURE MICRO LTX 7

## (undated) DEVICE — GLOVE SENSICARE PI GRN 7

## (undated) DEVICE — GLOVE PROTEXIS BLUE LATEX 7

## (undated) DEVICE — JELLY SURGILUBE LUBE TUBE 2OZ

## (undated) DEVICE — COVER LIGHT HANDLE

## (undated) DEVICE — BOWL UTILITY BLUE 32OZ

## (undated) DEVICE — INJECTION OMPQ 240MG 10X50ML

## (undated) DEVICE — DRAPE CASSETTE X-RAY 24X40IN

## (undated) DEVICE — TRAY CATH URET RED RUBBER 15FR

## (undated) DEVICE — SYR ONLY LUER LOCK 20CC

## (undated) DEVICE — GAUZE SPONGE 4X4 12PLY

## (undated) DEVICE — GLOVE PROTEXIS LTX MICRO  7